# Patient Record
Sex: FEMALE | Race: WHITE | Employment: OTHER | URBAN - METROPOLITAN AREA
[De-identification: names, ages, dates, MRNs, and addresses within clinical notes are randomized per-mention and may not be internally consistent; named-entity substitution may affect disease eponyms.]

---

## 2016-12-30 LAB — CREATININE, EXTERNAL: 1.16

## 2017-02-06 RX ORDER — ESOMEPRAZOLE MAGNESIUM 40 MG/1
CAPSULE, DELAYED RELEASE ORAL
Qty: 90 CAP | Refills: 3 | OUTPATIENT
Start: 2017-02-06

## 2017-02-13 RX ORDER — ESOMEPRAZOLE MAGNESIUM 40 MG/1
40 CAPSULE, DELAYED RELEASE ORAL DAILY
Qty: 90 CAP | Refills: 2 | Status: SHIPPED | COMMUNITY
Start: 2017-02-13 | End: 2017-11-09 | Stop reason: SDUPTHER

## 2017-02-13 NOTE — TELEPHONE ENCOUNTER
[facility] from [facility] called asking for status of this refill request, she needs a call today because this script has been open since last week.     Call her @ [phone]    Reference # [id number]

## 2017-02-13 NOTE — TELEPHONE ENCOUNTER
Orders Placed This Encounter    esomeprazole (NEXIUM) 40 mg capsule     Sig: Take 1 Cap by mouth daily. Dispense:  90 Cap     Refill:  2     The above medication refills were approved via verbal order by Dr. Urbano Mckenna III.

## 2017-02-17 ENCOUNTER — TELEPHONE (OUTPATIENT)
Dept: INTERNAL MEDICINE CLINIC | Age: 62
End: 2017-02-17

## 2017-02-17 NOTE — TELEPHONE ENCOUNTER
[facility] called to let you know pt's insuance will not cover the esomeprazole (NEXIUM) 40 mg capsules until an approved alternate has been tried. They are faxing a second request and said pt's order will  on Monday.

## 2017-02-20 NOTE — TELEPHONE ENCOUNTER
[facility] called again. Said that [employer] will cover Omeprazole and that if it is not entered today that the order will be cancelled.

## 2017-02-20 NOTE — TELEPHONE ENCOUNTER
Unable to contact patient at this time. Calling to see if she wants to pay out of pocket for nexium. [facility] states that pt said that omeprazole did not work.

## 2017-08-04 RX ORDER — ATORVASTATIN CALCIUM 10 MG/1
TABLET, FILM COATED ORAL
Qty: 90 TAB | Refills: 3 | Status: SHIPPED | OUTPATIENT
Start: 2017-08-04 | End: 2018-07-31 | Stop reason: SDUPTHER

## 2017-09-11 ENCOUNTER — OFFICE VISIT (OUTPATIENT)
Dept: INTERNAL MEDICINE CLINIC | Age: 62
End: 2017-09-11

## 2017-09-11 VITALS
WEIGHT: 152 LBS | SYSTOLIC BLOOD PRESSURE: 120 MMHG | OXYGEN SATURATION: 98 % | TEMPERATURE: 97.9 F | RESPIRATION RATE: 16 BRPM | DIASTOLIC BLOOD PRESSURE: 70 MMHG | HEART RATE: 98 BPM | HEIGHT: 61 IN | BODY MASS INDEX: 28.7 KG/M2

## 2017-09-11 DIAGNOSIS — Z12.11 COLON CANCER SCREENING: ICD-10-CM

## 2017-09-11 DIAGNOSIS — Z00.00 ROUTINE GENERAL MEDICAL EXAMINATION AT A HEALTH CARE FACILITY: Primary | ICD-10-CM

## 2017-09-11 DIAGNOSIS — Z12.39 BREAST CANCER SCREENING: ICD-10-CM

## 2017-09-11 DIAGNOSIS — B18.2 CHRONIC HEPATITIS C WITHOUT HEPATIC COMA (HCC): ICD-10-CM

## 2017-09-11 DIAGNOSIS — E78.2 MIXED HYPERLIPIDEMIA: ICD-10-CM

## 2017-09-11 DIAGNOSIS — Z23 NEED FOR ZOSTAVAX ADMINISTRATION: ICD-10-CM

## 2017-09-11 RX ORDER — DEXTROAMPHETAMINE SACCHARATE, AMPHETAMINE ASPARTATE, DEXTROAMPHETAMINE SULFATE AND AMPHETAMINE SULFATE 7.5; 7.5; 7.5; 7.5 MG/1; MG/1; MG/1; MG/1
15 TABLET ORAL 2 TIMES DAILY
Refills: 0 | COMMUNITY
Start: 2017-08-10

## 2017-09-11 RX ORDER — CITALOPRAM 20 MG/1
10 TABLET, FILM COATED ORAL DAILY
COMMUNITY
Start: 2017-08-28 | End: 2018-10-05 | Stop reason: ALTCHOICE

## 2017-09-11 NOTE — PATIENT INSTRUCTIONS
Learning About Benefits From Quitting Smoking  How does quitting smoking make you healthier? If you're thinking about quitting smoking, you may have a few reasons to be smoke-free. Your health may be one of them. · When you quit smoking, you lower your risks for cancer, lung disease, heart attack, stroke, blood vessel disease, and blindness from macular degeneration. · When you're smoke-free, you get sick less often, and you heal faster. You are less likely to get colds, flu, bronchitis, and pneumonia. · As a nonsmoker, you may find that your mood is better and you are less stressed. When and how will you feel healthier? Quitting has real health benefits that start from day 1 of being smoke-free. And the longer you stay smoke-free, the healthier you get and the better you feel. The first hours  · After just 20 minutes, your blood pressure and heart rate go down. That means there's less stress on your heart and blood vessels. · Within 12 hours, the level of carbon monoxide in your blood drops back to normal. That makes room for more oxygen. With more oxygen in your body, you may notice that you have more energy than when you smoked. After 2 weeks  · Your lungs start to work better. · Your risk of heart attack starts to drop. After 1 month  · When your lungs are clear, you cough less and breathe deeper, so it's easier to be active. · Your sense of taste and smell return. That means you can enjoy food more than you have since you started smoking. Over the years  · After 1 year, your risk of heart disease is half what it would be if you kept smoking. · After 5 years, your risk of stroke starts to shrink. Within a few years after that, it's about the same as if you'd never smoked. · After 10 years, your risk of dying from lung cancer is cut by about half. And your risk for many other types of cancer is lower too. How would quitting help others in your life?   When you quit smoking, you improve the health of everyone who now breathes in your smoke. · Their heart, lung, and cancer risks drop, much like yours. · They are sick less. For babies and small children, living smoke-free means they're less likely to have ear infections, pneumonia, and bronchitis. · If you're a woman who is or will be pregnant someday, quitting smoking means a healthier . · Children who are close to you are less likely to become adult smokers.

## 2017-09-11 NOTE — MR AVS SNAPSHOT
Visit Information Date & Time Provider Department Dept. Phone Encounter #  
 9/11/2017  4:00 PM Rohit Elena MD [facility] Internal Medicine [phone] [id number] Upcoming Health Maintenance Date Due COLONOSCOPY 10/6/2015 BREAST CANCER SCRN MAMMOGRAM 5/28/2017 ZOSTER VACCINE AGE 60> 7/15/2017 INFLUENZA AGE 9 TO ADULT 8/1/2017 PAP AKA CERVICAL CYTOLOGY 1/9/2018 DTaP/Tdap/Td series (2 - Td) 9/14/2022 Allergies as of 9/11/2017  Review Complete On: 9/11/2017 By: Rohit Elena MD  
  
 Severity Noted Reaction Type Reactions Tetracycline Medium 01/27/2010   Side Effect Other (comments) GI upset Current Immunizations  Reviewed on 8/19/2015 Name Date Influenza High Dose Vaccine PF  Deferred (Medication not available) Influenza Vaccine 11/4/2014 Influenza Vaccine Split 11/15/2010 Pneumococcal Polysaccharide (PPSV-23) 2/4/2015  2:52 PM,  Deferred (Patient Refused) TDAP Vaccine 9/14/2012 Not reviewed this visit You Were Diagnosed With   
  
 Codes Comments Routine general medical examination at a health care facility    -  Primary ICD-10-CM: Z00.00 ICD-9-CM: V70.0 Breast cancer screening     ICD-10-CM: Z12.39 
ICD-9-CM: V76.10 Need for Zostavax administration     ICD-10-CM: J38 ICD-9-CM: V04.89 Colon cancer screening     ICD-10-CM: Z12.11 ICD-9-CM: V76.51 Chronic hepatitis C without hepatic coma (HCC)     ICD-10-CM: B18.2 ICD-9-CM: 070.54 Mixed hyperlipidemia     ICD-10-CM: E78.2 ICD-9-CM: 272.2 Vitals BP Pulse Temp Resp Height(growth percentile) Weight(growth percentile) 120/70 98 97.9 °F (36.6 °C) (Oral) 16 5' 1\" (1.549 m) 152 lb (68.9 kg) SpO2 BMI OB Status Smoking Status 98% 28.72 kg/m2 Hysterectomy Current Every Day Smoker Vitals History BMI and BSA Data Body Mass Index Body Surface Area 28.72 kg/m 2 1.72 m 2 Preferred Pharmacy Pharmacy Name Phone [address], [facility] [phone] Your Updated Medication List  
  
   
This list is accurate as of: 17  4:56 PM.  Always use your most recent med list.  
  
  
  
  
 aspirin delayed-release 81 mg tablet Take 81 mg by mouth nightly. atorvastatin 10 mg tablet Commonly known as:  LIPITOR  
TAKE 1 TABLET NIGHTLY  
  
 citalopram 20 mg tablet Commonly known as:  Judythe Grade Take 10 mg by mouth daily. dextroamphetamine-amphetamine 30 mg tablet Commonly known as:  ADDERALL  
TAKE 1 TABLET BY MOUTH TWICE A DAY  
  
 esomeprazole 40 mg capsule Commonly known as:  Brittany Kugel Take 1 Cap by mouth daily. KlonoPIN 0.5 mg tablet Generic drug:  clonazePAM  
Take 0.5 mg by mouth nightly. varicella zoster vacine live 19,400 unit/0.65 mL Susr injection Commonly known as:  ZOSTAVAX  
1 Vial by SubCUTAneous route once for 1 dose. Indications: PREVENTION OF HERPES ZOSTER  
  
 VITAMIN D3 2,000 unit Tab Generic drug:  cholecalciferol (vitamin D3) Take 2,000 Units by mouth daily. Prescriptions Printed Refills  
 varicella zoster vacine live (ZOSTAVAX) 19,400 unit/0.65 mL susr injection 0 Si Vial by SubCUTAneous route once for 1 dose. Indications: PREVENTION OF HERPES ZOSTER Class: Print Route: SubCUTAneous We Performed the Following CBC WITH AUTOMATED DIFF [04497 CPT(R)] LIPID PANEL [69205 CPT(R)] METABOLIC PANEL, COMPREHENSIVE [30335 CPT(R)] TSH RFX ON ABNORMAL TO FREE T4 [[id number] Custom] UA/M W/RFLX CULTURE, ROUTINE [[id number] Custom] To-Do List   
 2017 Imaging:  [facility] BI SCREENING INCL CAD Patient Instructions Learning About Benefits From Quitting Smoking How does quitting smoking make you healthier? If you're thinking about quitting smoking, you may have a few reasons to be smoke-free. Your health may be one of them. · When you quit smoking, you lower your risks for cancer, lung disease, heart attack, stroke, blood vessel disease, and blindness from macular degeneration. · When you're smoke-free, you get sick less often, and you heal faster. You are less likely to get colds, flu, bronchitis, and pneumonia. · As a nonsmoker, you may find that your mood is better and you are less stressed. When and how will you feel healthier? Quitting has real health benefits that start from day 1 of being smoke-free. And the longer you stay smoke-free, the healthier you get and the better you feel. The first hours · After just 20 minutes, your blood pressure and heart rate go down. That means there's less stress on your heart and blood vessels. · Within 12 hours, the level of carbon monoxide in your blood drops back to normal. That makes room for more oxygen. With more oxygen in your body, you may notice that you have more energy than when you smoked. After 2 weeks · Your lungs start to work better. · Your risk of heart attack starts to drop. After 1 month · When your lungs are clear, you cough less and breathe deeper, so it's easier to be active. · Your sense of taste and smell return. That means you can enjoy food more than you have since you started smoking. Over the years · After 1 year, your risk of heart disease is half what it would be if you kept smoking. · After 5 years, your risk of stroke starts to shrink. Within a few years after that, it's about the same as if you'd never smoked. · After 10 years, your risk of dying from lung cancer is cut by about half. And your risk for many other types of cancer is lower too. How would quitting help others in your life? When you quit smoking, you improve the health of everyone who now breathes in your smoke. · Their heart, lung, and cancer risks drop, much like yours. · They are sick less.  For babies and small children, living smoke-free means they're less likely to have ear infections, pneumonia, and bronchitis. · If you're a woman who is or will be pregnant someday, quitting smoking means a healthier . · Children who are close to you are less likely to become adult smokers. Introducing [facility]! Dear Luisana Aviles: Thank you for requesting a [employer] account. Our records indicate that you already have an active [employer] account. You can access your account anytime at [email or web address] Did you know that you can access your hospital and ER discharge instructions at any time in Click[employer]? You can also review all of your test results from your hospital stay or ER visit. Additional Information If you have questions, please visit the Frequently Asked Questions section of the [employer] website at [email or web address] Remember, Click[employer] is NOT to be used for urgent needs. For medical emergencies, dial 911. Now available from your iPhone and Android! Please provide this summary of care documentation to your next provider. Your primary care clinician is listed as Luis [phone] If you have any questions after today's visit, please call [phone].

## 2017-09-12 NOTE — PROGRESS NOTES
Subjective:   63 y.o. female for Well Woman Check. Her gyne and breast care is done elsewhere by her Ob-Gyne physician. She has been doing better recently. She did have an episode of major depression earlier this year and was taken out of work. She was offered early FDC and accepted it. She has been doing much better since then. Her weight is down. She is attempted to quit smoking unsuccessfully. Denies headaches or dizziness. No nosebleeds. No chest pains or shortness of breath. No cough or wheeze. No change in bowel or bladder habits. 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 Indications: PREVENTION OF HERPES ZOSTER 0.65 mL 0    dextroamphetamine-amphetamine (ADDERALL) 30 mg tablet TAKE 1 TABLET BY MOUTH TWICE A DAY  0    citalopram (CELEXA) 20 mg tablet Take 10 mg by mouth daily.  atorvastatin (LIPITOR) 10 mg tablet TAKE 1 TABLET NIGHTLY 90 Tab 3    esomeprazole (NEXIUM) 40 mg capsule Take 1 Cap by mouth daily. 90 Cap 2    aspirin delayed-release 81 mg tablet Take 81 mg by mouth nightly.  cholecalciferol, vitamin D3, (VITAMIN D3) 2,000 unit Tab Take 2,000 Units by mouth daily.  clonazePAM (KLONOPIN) 0.5 mg tablet Take 0.5 mg by mouth nightly.        Allergies   Allergen Reactions    Tetracycline Other (comments)     GI upset     Past Medical History:   Diagnosis Date    Anxiety     CAD (coronary artery disease)     Colon polyps     Depression     GERD (gastroesophageal reflux disease)     Hemorrhoids     Hepatitis C     genotype 1    History of kidney stones     Hx of amaurosis fugax     right eye, 2014    Liver disease 2011    hep c, [place] 2011    Meningioma [facility]) 2014    SURGERY TO BE SCHEDULED    Osteoporosis 2008    Psychiatric disorder     ANXIETY AND DEPRESSION    PVD (peripheral vascular disease) ([facility] [address] 2/2014    W/STENT , DR Neela Kirk    Seizures ([facility] [address] 2013    ?  SEIZURE, CONFUSION, TROUBLE WITH SPEECH     Past Surgical History:   Procedure Laterality Date    HX CHOLECYSTECTOMY  2001    HX CRANIOTOMY  6/25/14    meningioma r medial sphenoid wing    HX GYN  1984    Tubal pregnancy    HX HYSTERECTOMY      HX OTHER SURGICAL  2/27/2014    Dr. Pennie Subramanian      [facility] UNLIST  2/2014    ABD AORTIC STENT    VASCULAR SURGERY PROCEDURE UNLIST Right 6/10/14    CAROTID      Family History   Problem Relation Age of Onset    Heart Disease Mother     Anxiety Mother     Cancer Mother      Lung    Heart Surgery Mother      CABG X3 VESSEL    Cancer Sister      vaginal    Other Sister      fibromyalgia    Diabetes Sister     Anxiety Sister     Anesth Problems Neg Hx      Social History   Substance Use Topics    Smoking status: Current Every Day Smoker     Packs/day: 0.25     Years: 43.00     Types: Cigarettes    Smokeless tobacco: Never Used      Comment: joined [place] [employer]  Alcohol use No        Lab Results  Component Value Date/Time   WBC 9.7 03/31/2016 03:30 PM   HGB 12.3 03/31/2016 03:30 PM   Hemoglobin (POC) 13.9 05/13/2011 11:36 AM   HCT 38.8 03/31/2016 03:30 PM   Hematocrit (POC) 41 05/13/2011 11:36 AM   PLATELET 923 89/90/4383 03:30 PM   MCV 88 03/31/2016 03:30 PM   Lab Results  Component Value Date/Time   Cholesterol, total 136 08/21/2015 09:18 AM   HDL Cholesterol 40 08/21/2015 09:18 AM   LDL, calculated 76 08/21/2015 09:18 AM   Triglyceride 99 08/21/2015 09:18 AM CHOL/HDL Ratio 3.8 02/04/2015 03:25 AM   Lab Results  Component Value Date/Time   ALT (SGPT) 20 07/21/2016 11:27 PM   AST (SGOT) 18 07/21/2016 11:27 PM   Alk. phosphatase 156 07/21/2016 11:27 PM   Bilirubin, direct <0.1 07/21/2016 11:27 PM   Bilirubin, total 0.2 07/21/2016 11:27 PM   Albumin 3.7 07/21/2016 11:27 PM   Protein, total 7.2 07/21/2016 11:27 PM   INR 1.1 02/04/2015 03:25 AM   Prothrombin time 10.6 02/04/2015 03:25 AM   PLATELET 585 96/45/1194 03:30 PM       Lab Results  Component Value Date/Time   GFR est non-AA 78 03/31/2016 03:30 PM   GFRNA, POC >60 05/13/2011 11:36 AM   GFR est AA 90 03/31/2016 03:30 PM   GFRAA, POC >60 05/13/2011 11:36 AM   Creatinine 0.83 03/31/2016 03:30 PM   Creatinine (POC) 0.6 05/13/2011 11:36 AM   BUN 15 03/31/2016 03:30 PM   BUN (POC) 8 05/13/2011 11:36 AM   Sodium 138 03/31/2016 03:30 PM   Sodium (POC) 140 05/13/2011 11:36 AM   Potassium 4.3 03/31/2016 03:30 PM   Potassium (POC) 4.3 05/13/2011 11:36 AM   Chloride 101 03/31/2016 03:30 PM   Chloride (POC) 105 05/13/2011 11:36 AM   CO2 23 03/31/2016 03:30 PM   Lab Results  Component Value Date/Time   TSH 3.370 05/20/2013 08:36 AM   T3 Uptake 21 09/16/2011 12:00 AM   T4, Total 16.6 09/16/2011 12:00 AM      Lab Results   Component Value Date/Time    Glucose 101 03/31/2016 03:30 PM    Glucose (POC) 94 05/13/2011 11:36 AM    .    Review of Systems  A comprehensive review of systems was negative except for that written in the HPI. Objective:   Blood pressure 120/70, pulse 98, temperature 97.9 °F (36.6 °C), temperature source Oral, resp. rate 16, height 5' 1\" (1.549 m), weight 152 lb (68.9 kg), SpO2 98 %.    Physical Examination:   General appearance - alert, well appearing, and in no distress  Eyes - pupils equal and reactive, extraocular eye movements intact  Ears - bilateral TM's and external ear canals normal  Nose - normal and patent, no erythema, discharge or polyps  Mouth - mucous membranes moist, pharynx normal without lesions  Neck - supple, no significant adenopathy  Lymphatics - no palpable lymphadenopathy, no hepatosplenomegaly  Chest - clear to auscultation, no wheezes, rales or rhonchi, symmetric air entry  Heart - normal rate, regular rhythm, normal S1, S2, no murmurs, rubs, clicks or gallops  Abdomen - soft, nontender, nondistended, no masses or organomegaly  Back exam - full range of motion, no tenderness, palpable spasm or pain on motion  Neurological - alert, oriented, normal speech, no focal findings or movement disorder noted  Musculoskeletal - no joint tenderness, deformity or swelling  Extremities - peripheral pulses normal, no pedal edema, no clubbing or cyanosis     Assessment/Plan:     lose weight, increase physical activity, stop smoking (advice and handout given), routine labs ordered  Diagnoses and all orders for this visit:    1. Routine general medical examination at a health care facility  -     CBC WITH AUTOMATED DIFF  -     METABOLIC PANEL, COMPREHENSIVE  -     LIPID PANEL  -     TSH RFX ON ABNORMAL TO FREE T4  -     UA/M W/RFLX CULTURE, ROUTINE  -     varicella zoster vacine live (ZOSTAVAX) 19,400 unit/0.65 mL susr injection; 1 Vial by SubCUTAneous route once for 1 dose. Indications: PREVENTION OF HERPES ZOSTER    2. Breast cancer screening  -     [facility] MAMMO BI SCREENING INCL CAD; Future    3. Need for Zostavax administration    4. Colon cancer screening    5. Chronic hepatitis C without hepatic coma (HCC) now cured. Will check her liver function test for stability. 6. Mixed hyperlipidemia check lipids for LDL goal of 100. We will continue her current medicines for now assuming this is reached. Appears to be tolerating well. Follow-up Disposition: 12 months and as needed   Advised her to call back or return to office if symptoms worsen/change/persist.  Discussed expected course/resolution/complications of diagnosis in detail with patient.     Medication risks/benefits/costs/interactions/alternatives discussed with patient. She was given an after visit summary which includes diagnoses, current medications, & vitals. She expressed understanding with the diagnosis and plan.

## 2017-09-17 LAB
ALBUMIN SERPL-MCNC: 4.2 G/DL (ref 3.6–4.8)
ALBUMIN/GLOB SERPL: 1.6 {RATIO} (ref 1.2–2.2)
ALP SERPL-CCNC: 159 IU/L (ref 39–117)
ALT SERPL-CCNC: 10 IU/L (ref 0–32)
APPEARANCE UR: CLEAR
AST SERPL-CCNC: 19 IU/L (ref 0–40)
BACTERIA #/AREA URNS HPF: NORMAL /[HPF]
BASOPHILS # BLD AUTO: 0 X10E3/UL (ref 0–0.2)
BASOPHILS NFR BLD AUTO: 1 %
BILIRUB SERPL-MCNC: 0.2 MG/DL (ref 0–1.2)
BILIRUB UR QL STRIP: NEGATIVE
BUN SERPL-MCNC: 13 MG/DL (ref 8–27)
BUN/CREAT SERPL: 17 (ref 12–28)
CALCIUM SERPL-MCNC: 9.6 MG/DL (ref 8.7–10.3)
CASTS URNS QL MICRO: NORMAL /LPF
CHLORIDE SERPL-SCNC: 98 MMOL/L (ref 96–106)
CHOLEST SERPL-MCNC: 161 MG/DL (ref 100–199)
CO2 SERPL-SCNC: 23 MMOL/L (ref 18–29)
COLOR UR: YELLOW
CREAT SERPL-MCNC: 0.76 MG/DL (ref 0.57–1)
EOSINOPHIL # BLD AUTO: 0.1 X10E3/UL (ref 0–0.4)
EOSINOPHIL NFR BLD AUTO: 2 %
EPI CELLS #/AREA URNS HPF: NORMAL /HPF
ERYTHROCYTE [DISTWIDTH] IN BLOOD BY AUTOMATED COUNT: 13.5 % (ref 12.3–15.4)
GLOBULIN SER CALC-MCNC: 2.7 G/DL (ref 1.5–4.5)
GLUCOSE SERPL-MCNC: 91 MG/DL (ref 65–99)
GLUCOSE UR QL: NEGATIVE
HCT VFR BLD AUTO: 42.4 % (ref 34–46.6)
HDLC SERPL-MCNC: 46 MG/DL
HGB BLD-MCNC: 13.9 G/DL (ref 11.1–15.9)
HGB UR QL STRIP: NEGATIVE
IMM GRANULOCYTES # BLD: 0 X10E3/UL (ref 0–0.1)
IMM GRANULOCYTES NFR BLD: 0 %
KETONES UR QL STRIP: NEGATIVE
LDLC SERPL CALC-MCNC: 95 MG/DL (ref 0–99)
LEUKOCYTE ESTERASE UR QL STRIP: NEGATIVE
LYMPHOCYTES # BLD AUTO: 3 X10E3/UL (ref 0.7–3.1)
LYMPHOCYTES NFR BLD AUTO: 37 %
MCH RBC QN AUTO: 29 PG (ref 26.6–33)
MCHC RBC AUTO-ENTMCNC: 32.8 G/DL (ref 31.5–35.7)
MCV RBC AUTO: 88 FL (ref 79–97)
MICRO URNS: NORMAL
MICRO URNS: NORMAL
MONOCYTES # BLD AUTO: 0.4 X10E3/UL (ref 0.1–0.9)
MONOCYTES NFR BLD AUTO: 5 %
MUCOUS THREADS URNS QL MICRO: PRESENT
NEUTROPHILS # BLD AUTO: 4.6 X10E3/UL (ref 1.4–7)
NEUTROPHILS NFR BLD AUTO: 55 %
NITRITE UR QL STRIP: NEGATIVE
PH UR STRIP: 6.5 [PH] (ref 5–7.5)
PLATELET # BLD AUTO: 237 X10E3/UL (ref 150–379)
POTASSIUM SERPL-SCNC: 4.4 MMOL/L (ref 3.5–5.2)
PROT SERPL-MCNC: 6.9 G/DL (ref 6–8.5)
PROT UR QL STRIP: NEGATIVE
RBC # BLD AUTO: 4.8 X10E6/UL (ref 3.77–5.28)
RBC #/AREA URNS HPF: NORMAL /HPF
SODIUM SERPL-SCNC: 139 MMOL/L (ref 134–144)
SP GR UR: 1.02 (ref 1–1.03)
TRIGL SERPL-MCNC: 100 MG/DL (ref 0–149)
TSH SERPL DL<=0.005 MIU/L-ACNC: 3.03 UIU/ML (ref 0.45–4.5)
URINALYSIS REFLEX, 377202: NORMAL
UROBILINOGEN UR STRIP-MCNC: 0.2 MG/DL (ref 0.2–1)
VLDLC SERPL CALC-MCNC: 20 MG/DL (ref 5–40)
WBC # BLD AUTO: 8.1 X10E3/UL (ref 3.4–10.8)
WBC #/AREA URNS HPF: NORMAL /HPF

## 2017-09-27 ENCOUNTER — HOSPITAL ENCOUNTER (OUTPATIENT)
Dept: MAMMOGRAPHY | Age: 62
Discharge: HOME | End: 2017-09-27
Attending: INTERNAL MEDICINE
Payer: COMMERCIAL

## 2017-09-27 DIAGNOSIS — Z12.39 BREAST CANCER SCREENING: ICD-10-CM

## 2017-09-27 PROCEDURE — 77067 SCR MAMMO BI INCL CAD: CPT

## 2017-11-09 RX ORDER — ESOMEPRAZOLE MAGNESIUM 40 MG/1
CAPSULE, DELAYED RELEASE ORAL
Qty: 90 CAP | Refills: 3 | Status: SHIPPED | OUTPATIENT
Start: 2017-11-09 | End: 2018-03-28

## 2018-03-28 ENCOUNTER — OFFICE VISIT (OUTPATIENT)
Dept: INTERNAL MEDICINE CLINIC | Age: 63
End: 2018-03-28

## 2018-03-28 VITALS
DIASTOLIC BLOOD PRESSURE: 70 MMHG | WEIGHT: 149.2 LBS | OXYGEN SATURATION: 96 % | HEART RATE: 97 BPM | SYSTOLIC BLOOD PRESSURE: 130 MMHG | RESPIRATION RATE: 18 BRPM | BODY MASS INDEX: 28.17 KG/M2 | HEIGHT: 61 IN | TEMPERATURE: 98.7 F

## 2018-03-28 DIAGNOSIS — G56.01 CARPAL TUNNEL SYNDROME OF RIGHT WRIST: ICD-10-CM

## 2018-03-28 DIAGNOSIS — D32.9 MENINGIOMA (HCC): Primary | ICD-10-CM

## 2018-03-28 DIAGNOSIS — I65.21 OBSTRUCTION OF RIGHT CAROTID ARTERY: ICD-10-CM

## 2018-03-28 DIAGNOSIS — I73.9 PVD (PERIPHERAL VASCULAR DISEASE) (HCC): ICD-10-CM

## 2018-03-28 RX ORDER — PANTOPRAZOLE SODIUM 40 MG/1
40 TABLET, DELAYED RELEASE ORAL DAILY
Qty: 90 TAB | Refills: 3 | Status: SHIPPED | OUTPATIENT
Start: 2018-03-28 | End: 2018-10-05 | Stop reason: ALTCHOICE

## 2018-03-28 NOTE — MR AVS SNAPSHOT
[address] [facility] [address] [place] [address] 
[phone] Patient: Zoran Abbott MRN:  [id number] Visit Information Date & Time Provider Department Dept. Phone Encounter #  
 3/28/2018 10:00 AM Va Shoemaker MD [facility] Internal Medicine [phone] [id number] Follow-up Instructions Return in about 6 months (around 9/28/2018). Upcoming Health Maintenance Date Due  
 BREAST CANCER SCRN MAMMOGRAM 9/27/2019 PAP AKA CERVICAL CYTOLOGY 8/31/2020 COLONOSCOPY 10/27/2020 DTaP/Tdap/Td series (2 - Td) 9/14/2022 Allergies as of 3/28/2018  Review Complete On: 9/11/2017 By: Va Shoemaker MD  
  
 Severity Noted Reaction Type Reactions Tetracycline Medium 01/27/2010   Side Effect Other (comments) GI upset Current Immunizations  Reviewed on 3/28/2018 Name Date Influenza High Dose Vaccine PF  Deferred (Medication not available) Influenza Vaccine 11/6/2017, 11/4/2014 Influenza Vaccine Split 11/15/2010 Pneumococcal Polysaccharide (PPSV-23) 2/4/2015  2:52 PM,  Deferred (Patient Refused) TDAP Vaccine 9/14/2012 Zoster Vaccine, Live 11/6/2017 Reviewed by Va Shoemaker MD on 3/28/2018 at 10:38 AM  
You Were Diagnosed With   
  
 Codes Comments Meningioma ([facility]    -  Primary ICD-10-CM: D32.9 ICD-9-CM: 225.2 Obstruction of right carotid artery     ICD-10-CM: I65.21 ICD-9-CM: 433.10 PVD (peripheral vascular disease) ([facility]     ICD-10-CM: I73.9 ICD-9-CM: 443.9 Carpal tunnel syndrome of right wrist     ICD-10-CM: G56.01 
ICD-9-CM: 354.0 Vitals BP Pulse Temp Resp Height(growth percentile) Weight(growth percentile) 130/70 97 98.7 °F (37.1 °C) (Oral) 18 5' 1\" (1.549 m) 149 lb 3.2 oz (67.7 kg) SpO2 BMI OB Status Smoking Status 96% 28.19 kg/m2 Hysterectomy Current Every Day Smoker Vitals History BMI and BSA Data Body Mass Index Body Surface Area  
 28.19 kg/m 2 1.71 m 2 Preferred Pharmacy Pharmacy Name Phone [address] [facility] [phone] Your Updated Medication List  
  
   
This list is accurate as of 3/28/18 10:39 AM.  Always use your most recent med list.  
  
  
  
  
 aspirin delayed-release 81 mg tablet Take 81 mg by mouth nightly. atorvastatin 10 mg tablet Commonly known as:  LIPITOR  
TAKE 1 TABLET NIGHTLY  
  
 citalopram 20 mg tablet Commonly known as:  Chepe Bird Take 10 mg by mouth daily. dextroamphetamine-amphetamine 30 mg tablet Commonly known as:  ADDERALL  
TAKE 1 TABLET BY MOUTH TWICE A DAY KlonoPIN 0.5 mg tablet Generic drug:  clonazePAM  
Take 0.5 mg by mouth nightly. pantoprazole 40 mg tablet Commonly known as:  PROTONIX Take 1 Tab by mouth daily. VITAMIN D3 2,000 unit Tab Generic drug:  cholecalciferol (vitamin D3) Take 2,000 Units by mouth daily. Prescriptions Sent to Pharmacy Refills  
 pantoprazole (PROTONIX) 40 mg tablet 3 Sig: Take 1 Tab by mouth daily. Class: Normal  
 Pharmacy: [address], [address] Ph #: [phone] Route: Oral  
  
We Performed the Following CBC WITH AUTOMATED DIFF [18163 CPT(R)] LIPID PANEL [65036 CPT(R)] METABOLIC PANEL, COMPREHENSIVE [71653 CPT(R)] Follow-up Instructions Return in about 6 months (around 9/28/2018). Patient Instructions Carpal Tunnel Syndrome: Care Instructions Your Care Instructions Carpal tunnel syndrome is a nerve problem. It can cause tingling, numbness, weakness, or pain in the fingers, thumb, and hand. The median nerve and several tough tissues called tendons run through a space in the wrist called the carpal tunnel.  The repeated hand motions used in work and some hobbies and sports can put pressure on the nerve. Pregnancy and several conditions, including diabetes, arthritis, and an underactive thyroid, also can cause carpal tunnel syndrome. You may be able to limit an activity or do it differently to reduce your symptoms. You also can take other steps to feel better. If your symptoms are mild, 1 to 2 weeks of home treatment are likely to ease your pain. Surgery is needed only if other treatments do not work. Follow-up care is a key part of your treatment and safety. Be sure to make and go to all appointments, and call your doctor if you are having problems. It's also a good idea to know your test results and keep a list of the medicines you take. How can you care for yourself at home? · If possible, stop or reduce the activity that causes your symptoms. If you cannot stop the activity, take frequent breaks to rest and stretch or change hand positions to do a task. Try switching hands, such as when using a computer mouse. · Try to avoid bending or twisting your wrists. · Ask your doctor if you can take an over-the-counter pain medicine, such as acetaminophen (Tylenol), ibuprofen (Advil, Motrin), or naproxen (Aleve). Be safe with medicines. Read and follow all instructions on the label. · If your doctor prescribes corticosteroid medicine to help reduce pain and swelling, take it exactly as prescribed. Call your doctor if you think you are having a problem with your medicine. · Put ice or a cold pack on your wrist for 10 to 20 minutes at a time to ease pain. Put a thin cloth between the ice and your skin. · If your doctor or your physical or occupational therapist tells you to wear a wrist splint, wear it as directed to keep your wrist in a neutral position. This also eases pressure on your median nerve. · Ask your doctor whether you should have physical or occupational therapy to learn how to do tasks differently. · Try a yoga class to stretch your muscles and build strength in your hands and wrists. Yoga has been shown to ease carpal tunnel symptoms. To prevent carpal tunnel · When working at a computer, keep your hands and wrists in line with your forearms. Hold your elbows close to your sides. Take a break every 10 to 15 minutes. · Try these exercises: ¨ Warm up: Rotate your wrist up, down, and from side to side. Repeat this 4 times. Stretch your fingers far apart, relax them, then stretch them again. Repeat 4 times. Stretch your thumb by pulling it back gently, holding it, and then releasing it. Repeat 4 times. ¨ Prayer stretch: Start with your palms together in front of your chest just below your chin. Slowly lower your hands toward your waistline while keeping your hands close to your stomach and your palms together until you feel a mild to moderate stretch under your forearms. Hold for 10 to 20 seconds. Repeat 4 times. ¨ Wrist flexor stretch: Hold your arm in front of you with your palm up. Bend your wrist, pointing your hand toward the floor. With your other hand, gently bend your wrist further until you feel a mild to moderate stretch in your forearm. Hold for 10 to 20 seconds. Repeat 4 times. ¨ Wrist extensor stretch: Repeat the steps for the wrist flexor stretch, but begin with your extended hand palm down. · Squeeze a rubber exercise ball several times a day to keep your hands and fingers strong. · Avoid holding objects (such as a book) in one position for a long time. When possible, use your whole hand to grasp an object. Using just the thumb and index finger can put stress on the wrist. 
· Do not smoke. It can make this condition worse by reducing blood flow to the median nerve. If you need help quitting, talk to your doctor about stop-smoking programs and medicines. These can increase your chances of quitting for good. When should you call for help? Watch closely for changes in your health, and be sure to contact your doctor if: 
? · Your pain or other problems do not get better with home care. ? · You want more information about physical or occupational therapy. ? · You have side effects of your corticosteroid medicine, such as: 
¨ Weight gain. ¨ Mood changes. ¨ Trouble sleeping. ¨ Bruising easily. ? · You have any other problems with your medicine. Where can you learn more? Go to http://akira-francia.info/. Enter R432 in the search box to learn more about \"Carpal Tunnel Syndrome: Care Instructions. \" Current as of: March 21, 2017 Content Version: 11.4 © 3251-1868 [employer]. Care instructions adapted under license by [employer] (which disclaims liability or warranty for this information). If you have questions about a medical condition or this instruction, always ask your healthcare professional. [place] [address] any warranty or liability for your use of this information. Carpal Tunnel Syndrome: Exercises Your Care Instructions Here are some examples of typical rehabilitation exercises for your condition. Start each exercise slowly. Ease off the exercise if you start to have pain. Your doctor or your physical or occupational therapist will tell you when you can start these exercises and which ones will work best for you. Warm-up stretches When you no longer have pain or numbness, you can do exercises to help prevent carpal tunnel syndrome from coming back. Do not do any stretch or movement that is uncomfortable or painful. 1. Rotate your wrist up, down, and from side to side. Repeat 4 times. 2. Stretch your fingers far apart. Relax them, and then stretch them again. Repeat 4 times. 3. Stretch your thumb by pulling it back gently, holding it, and then releasing it. Repeat 4 times. How to do the exercises Prayer stretch 1. Start with your palms together in front of your chest just below your chin. 2. Slowly lower your hands toward your waistline, keeping your hands close to your stomach and your palms together until you feel a mild to moderate stretch under your forearms. 3. Hold for at least 15 to 30 seconds. Repeat 2 to 4 times. Wrist flexor stretch 1. Extend your arm in front of you with your palm up. 2. Bend your wrist, pointing your hand toward the floor. 3. With your other hand, gently bend your wrist farther until you feel a mild to moderate stretch in your forearm. 4. Hold for at least 15 to 30 seconds. Repeat 2 to 4 times. Wrist extensor stretch 1. Repeat steps 1 through 4 of the stretch above, but begin with your extended hand palm down. Follow-up care is a key part of your treatment and safety. Be sure to make and go to all appointments, and call your doctor if you are having problems. It's also a good idea to know your test results and keep a list of the medicines you take. Where can you learn more? Go to http://akira-francia.info/. Enter Z570 in the search box to learn more about \"Carpal Tunnel Syndrome: Exercises. \" Current as of: March 21, 2017 Content Version: 11.4 © 5431-9460 Healthwise, [employer]. Care instructions adapted under license by [employer] (which disclaims liability or warranty for this information). If you have questions about a medical condition or this instruction, always ask your healthcare professional. [place] [address] any warranty or liability for your use of this information. Introducing [facility]! Dear Palak Cai: Thank you for requesting a [employer] account. Our records indicate that you already have an active [employer] account. You can access your account anytime at [email or web address] Did you know that you can access your hospital and ER discharge instructions at any time in Polwire? You can also review all of your test results from your hospital stay or ER visit. Additional Information If you have questions, please visit the Frequently Asked Questions section of the Polwire website at [email or web address] Remember, Polwire is NOT to be used for urgent needs. For medical emergencies, dial 911. Now available from your iPhone and Android! Please provide this summary of care documentation to your next provider. Your primary care clinician is listed as Luis [phone] If you have any questions after today's visit, please call [phone].

## 2018-03-28 NOTE — PATIENT INSTRUCTIONS
Carpal Tunnel Syndrome: Care Instructions  Your Care Instructions    Carpal tunnel syndrome is a nerve problem. It can cause tingling, numbness, weakness, or pain in the fingers, thumb, and hand. The median nerve and several tough tissues called tendons run through a space in the wrist called the carpal tunnel. The repeated hand motions used in work and some hobbies and sports can put pressure on the nerve. Pregnancy and several conditions, including diabetes, arthritis, and an underactive thyroid, also can cause carpal tunnel syndrome. You may be able to limit an activity or do it differently to reduce your symptoms. You also can take other steps to feel better. If your symptoms are mild, 1 to 2 weeks of home treatment are likely to ease your pain. Surgery is needed only if other treatments do not work. Follow-up care is a key part of your treatment and safety. Be sure to make and go to all appointments, and call your doctor if you are having problems. It's also a good idea to know your test results and keep a list of the medicines you take. How can you care for yourself at home? · If possible, stop or reduce the activity that causes your symptoms. If you cannot stop the activity, take frequent breaks to rest and stretch or change hand positions to do a task. Try switching hands, such as when using a computer mouse. · Try to avoid bending or twisting your wrists. · Ask your doctor if you can take an over-the-counter pain medicine, such as acetaminophen (Tylenol), ibuprofen (Advil, Motrin), or naproxen (Aleve). Be safe with medicines. Read and follow all instructions on the label. · If your doctor prescribes corticosteroid medicine to help reduce pain and swelling, take it exactly as prescribed. Call your doctor if you think you are having a problem with your medicine. · Put ice or a cold pack on your wrist for 10 to 20 minutes at a time to ease pain.  Put a thin cloth between the ice and your skin.  · If your doctor or your physical or occupational therapist tells you to wear a wrist splint, wear it as directed to keep your wrist in a neutral position. This also eases pressure on your median nerve. · Ask your doctor whether you should have physical or occupational therapy to learn how to do tasks differently. · Try a yoga class to stretch your muscles and build strength in your hands and wrists. Yoga has been shown to ease carpal tunnel symptoms. To prevent carpal tunnel  · When working at a computer, keep your hands and wrists in line with your forearms. Hold your elbows close to your sides. Take a break every 10 to 15 minutes. · Try these exercises:  ¨ Warm up: Rotate your wrist up, down, and from side to side. Repeat this 4 times. Stretch your fingers far apart, relax them, then stretch them again. Repeat 4 times. Stretch your thumb by pulling it back gently, holding it, and then releasing it. Repeat 4 times. ¨ Prayer stretch: Start with your palms together in front of your chest just below your chin. Slowly lower your hands toward your waistline while keeping your hands close to your stomach and your palms together until you feel a mild to moderate stretch under your forearms. Hold for 10 to 20 seconds. Repeat 4 times. ¨ Wrist flexor stretch: Hold your arm in front of you with your palm up. Bend your wrist, pointing your hand toward the floor. With your other hand, gently bend your wrist further until you feel a mild to moderate stretch in your forearm. Hold for 10 to 20 seconds. Repeat 4 times. ¨ Wrist extensor stretch: Repeat the steps for the wrist flexor stretch, but begin with your extended hand palm down. · Squeeze a rubber exercise ball several times a day to keep your hands and fingers strong. · Avoid holding objects (such as a book) in one position for a long time. When possible, use your whole hand to grasp an object.  Using just the thumb and index finger can put stress on the wrist.  · Do not smoke. It can make this condition worse by reducing blood flow to the median nerve. If you need help quitting, talk to your doctor about stop-smoking programs and medicines. These can increase your chances of quitting for good. When should you call for help? Watch closely for changes in your health, and be sure to contact your doctor if:  ? · Your pain or other problems do not get better with home care. ? · You want more information about physical or occupational therapy. ? · You have side effects of your corticosteroid medicine, such as:  ¨ Weight gain. ¨ Mood changes. ¨ Trouble sleeping. ¨ Bruising easily. ? · You have any other problems with your medicine. Where can you learn more? Go to http://akira-francia.info/. Enter R432 in the search box to learn more about \"Carpal Tunnel Syndrome: Care Instructions. \"  Current as of: March 21, 2017  Content Version: 11.4  © 2686-9493 [employer]. Care instructions adapted under license by [employer]er[employer]ound (which disclaims liability or warranty for this information). If you have questions about a medical condition or this instruction, always ask your healthcare professional. [place] [address] any warranty or liability for your use of this information. Carpal Tunnel Syndrome: Exercises  Your Care Instructions  Here are some examples of typical rehabilitation exercises for your condition. Start each exercise slowly. Ease off the exercise if you start to have pain. Your doctor or your physical or occupational therapist will tell you when you can start these exercises and which ones will work best for you. Warm-up stretches  When you no longer have pain or numbness, you can do exercises to help prevent carpal tunnel syndrome from coming back. Do not do any stretch or movement that is uncomfortable or painful. 1. Rotate your wrist up, down, and from side to side.  Repeat 4 times.  2. Stretch your fingers far apart. Relax them, and then stretch them again. Repeat 4 times. 3. Stretch your thumb by pulling it back gently, holding it, and then releasing it. Repeat 4 times. How to do the exercises  Prayer stretch    1. Start with your palms together in front of your chest just below your chin. 2. Slowly lower your hands toward your waistline, keeping your hands close to your stomach and your palms together until you feel a mild to moderate stretch under your forearms. 3. Hold for at least 15 to 30 seconds. Repeat 2 to 4 times. Wrist flexor stretch    1. Extend your arm in front of you with your palm up. 2. Bend your wrist, pointing your hand toward the floor. 3. With your other hand, gently bend your wrist farther until you feel a mild to moderate stretch in your forearm. 4. Hold for at least 15 to 30 seconds. Repeat 2 to 4 times. Wrist extensor stretch    1. Repeat steps 1 through 4 of the stretch above, but begin with your extended hand palm down. Follow-up care is a key part of your treatment and safety. Be sure to make and go to all appointments, and call your doctor if you are having problems. It's also a good idea to know your test results and keep a list of the medicines you take. Where can you learn more? Go to http://akira-francia.info/. Enter J564 in the search box to learn more about \"Carpal Tunnel Syndrome: Exercises. \"  Current as of: March 21, 2017  Content Version: 11.4  © 1454-2628 Healthwise, [employer]. Care instructions adapted under license by [employer] (which disclaims liability or warranty for this information). If you have questions about a medical condition or this instruction, always ask your healthcare professional. [address] any warranty or liability for your use of this information.

## 2018-03-29 NOTE — PROGRESS NOTES
HPI:  Angelina Andrade is a 63y.o. year old female who is here for a routine visit:    Here for her follow-up visit. She has been generally doing fairly well. She has noted over the last few weeks some pain in the left calf. She notes it mostly when she is up moving around. She has a follow-up appointment this Friday with her vascular surgeon. She has had tingling in her right hand particularly in her first 3 fingers. She denies any chest pains or shortness of breath. No cough or wheeze. No change in bowel habits. Her Nexium is no longer covered by her insurance. She previously failed Prilosec and we will try Protonix today to see if that works. Past Medical History:   Diagnosis Date    Anxiety     CAD (coronary artery disease)     Colon polyps     Depression     GERD (gastroesophageal reflux disease)     Hemorrhoids     Hepatitis C     genotype 1    History of kidney stones     Hx of amaurosis fugax     right eye, 2014    Liver disease 2011    hep c, [place] 2011    Meningi[facility]) 2014    SURGERY TO BE SCHEDULED    Osteoporosis 2008    Psychiatric disorder     ANXIETY AND DEPRESSION    PVD (peripheral vascular disease) ([facility] [address] 2/2014    W/STENT , DR Jerry Mosqueda    Seizures ([facility] [address] 2013    ? SEIZURE, CONFUSION, TROUBLE WITH SPEECH       Past Surgical History:   Procedure Laterality Date    HX CHOLECYSTECTOMY  2001    HX COLONOSCOPY  10/27/2015    HX CRANIOTOMY  6/25/14    meningioma r medial sphenoid wing    HX GYN  1984    Tubal pregnancy    HX HYSTERECTOMY      HX OTHER SURGICAL  2/27/2014    Dr. Cesar Claire  2/2014    ABD AORTIC STENT    VASCULAR SURGERY PROCEDURE UNLIST Right 6/10/14    CAROTID        Prior to Admission medications    Medication Sig Start Date End Date Taking? Authorizing Provider   pantoprazole (PROTONIX) 40 mg tablet Take 1 Tab by mouth daily.  3/28/18  Yes Janeth Morales MD   dextroamphetamine-amphetamine (ADDERALL) 30 mg tablet TAKE 1 TABLET BY MOUTH TWICE A DAY 8/10/17  Yes Historical Provider   citalopram (CELEXA) 20 mg tablet Take 10 mg by mouth daily. 8/28/17  Yes Historical Provider   atorvastatin (LIPITOR) 10 mg tablet TAKE 1 TABLET NIGHTLY 8/4/17  Yes Frankey Congo III, MD   aspirin delayed-release 81 mg tablet Take 81 mg by mouth nightly. Yes Historical Provider   cholecalciferol, vitamin D3, (VITAMIN D3) 2,000 unit Tab Take 2,000 Units by mouth daily. Yes Historical Provider   clonazePAM (KLONOPIN) 0.5 mg tablet Take 0.5 mg by mouth nightly. Yes Historical Provider       Social History     Social History    Marital status:      Spouse name: N/A    Number of children: N/A    Years of education: N/A     Occupational History    Not on file.      Social History Main Topics    Smoking status: Current Every Day Smoker     Packs/day: 0.25     Years: 43.00     Types: Cigarettes    Smokeless tobacco: Never Used      Comment: joined 2001 [facility] for [employer]  Alcohol use No    Drug use: No    Sexual activity: Yes     Partners: Male     Birth control/ protection: Surgical     Other Topics Concern    Not on file     Social History Narrative          ROS  Per HPI    Visit Vitals    /70    Pulse 97    Temp 98.7 °F (37.1 °C) (Oral)    Resp 18    Ht 5' 1\" (1.549 m)    Wt 149 lb 3.2 oz (67.7 kg)    SpO2 96%    BMI 28.19 kg/m2         Physical Exam   Physical Examination: General appearance - alert, well appearing, and in no distress  Chest - clear to auscultation, no wheezes, rales or rhonchi, symmetric air entry  Heart - normal rate, regular rhythm, normal S1, S2, no murmurs, rubs, clicks or gallops  Abdomen - soft, nontender, nondistended, no masses or organomegaly  Neurological - alert, oriented, normal speech, no focal findings or movement disorder noted  Musculoskeletal - no joint tenderness, deformity or swelling  Extremities - peripheral pulses normal, no pedal edema, no clubbing or cyanosis, Lilliam's sign negative bilaterally  Some mild tenderness over the flexor aspect of the right wrist.  Normal strength. Her left leg has no significant tenderness. Pulses are as above. Assessment/Plan:  Diagnoses and all orders for this visit:    1. Meningioma ([facility] [address]    2. Obstruction of right carotid artery -will have follow-up studies done this week and treated. LDL goal of 100. Will check her labs to be sure that this is met and adjust meds. 3. PVD (peripheral vascular disease) ([facility] [address] -? Recurrence in the left leg. She will get studies done this week with the vascular surgeon and let me know those results. -     METABOLIC PANEL, COMPREHENSIVE  -     LIPID PANEL  -     CBC WITH AUTOMATED DIFF    4. Carpal tunnel syndrome of right wrist -will treat with stretching exercises and a brace. She will let me know if not improving. Other orders  -     pantoprazole (PROTONIX) 40 mg tablet; Take 1 Tab by mouth daily. 5.  History of hepatitis C-resolved. She will have her liver function tests checked with us every 6 months as well as her lipids. Follow-up Disposition:  Return in about 6 months (around 9/28/2018). Advised her to call back or return to office if symptoms worsen/change/persist.  Discussed expected course/resolution/complications of diagnosis in detail with patient. Medication risks/benefits/costs/interactions/alternatives discussed with patient. She was given an after visit summary which includes diagnoses, current medications, & vitals. She expressed understanding with the diagnosis and plan.

## 2018-04-21 LAB
ALBUMIN SERPL-MCNC: 4 G/DL (ref 3.6–4.8)
ALBUMIN/GLOB SERPL: 1.7 {RATIO} (ref 1.2–2.2)
ALP SERPL-CCNC: 145 IU/L (ref 39–117)
ALT SERPL-CCNC: 9 IU/L (ref 0–32)
AST SERPL-CCNC: 18 IU/L (ref 0–40)
BASOPHILS # BLD AUTO: 0 X10E3/UL (ref 0–0.2)
BASOPHILS NFR BLD AUTO: 0 %
BILIRUB SERPL-MCNC: 0.3 MG/DL (ref 0–1.2)
BUN SERPL-MCNC: 14 MG/DL (ref 8–27)
BUN/CREAT SERPL: 17 (ref 12–28)
CALCIUM SERPL-MCNC: 8.8 MG/DL (ref 8.7–10.3)
CHLORIDE SERPL-SCNC: 98 MMOL/L (ref 96–106)
CHOLEST SERPL-MCNC: 155 MG/DL (ref 100–199)
CO2 SERPL-SCNC: 25 MMOL/L (ref 18–29)
CREAT SERPL-MCNC: 0.83 MG/DL (ref 0.57–1)
EOSINOPHIL # BLD AUTO: 0.1 X10E3/UL (ref 0–0.4)
EOSINOPHIL NFR BLD AUTO: 1 %
ERYTHROCYTE [DISTWIDTH] IN BLOOD BY AUTOMATED COUNT: 13.7 % (ref 12.3–15.4)
GFR SERPLBLD CREATININE-BSD FMLA CKD-EPI: 77 ML/MIN/1.73
GFR SERPLBLD CREATININE-BSD FMLA CKD-EPI: 89 ML/MIN/1.73
GLOBULIN SER CALC-MCNC: 2.4 G/DL (ref 1.5–4.5)
GLUCOSE SERPL-MCNC: 84 MG/DL (ref 65–99)
HCT VFR BLD AUTO: 39 % (ref 34–46.6)
HDLC SERPL-MCNC: 43 MG/DL
HGB BLD-MCNC: 12.9 G/DL (ref 11.1–15.9)
IMM GRANULOCYTES # BLD: 0 X10E3/UL (ref 0–0.1)
IMM GRANULOCYTES NFR BLD: 0 %
LDLC SERPL CALC-MCNC: 98 MG/DL (ref 0–99)
LYMPHOCYTES # BLD AUTO: 1.8 X10E3/UL (ref 0.7–3.1)
LYMPHOCYTES NFR BLD AUTO: 22 %
MCH RBC QN AUTO: 29.2 PG (ref 26.6–33)
MCHC RBC AUTO-ENTMCNC: 33.1 G/DL (ref 31.5–35.7)
MCV RBC AUTO: 88 FL (ref 79–97)
MONOCYTES # BLD AUTO: 0.6 X10E3/UL (ref 0.1–0.9)
MONOCYTES NFR BLD AUTO: 7 %
NEUTROPHILS # BLD AUTO: 5.8 X10E3/UL (ref 1.4–7)
NEUTROPHILS NFR BLD AUTO: 70 %
PLATELET # BLD AUTO: 213 X10E3/UL (ref 150–379)
POTASSIUM SERPL-SCNC: 4.4 MMOL/L (ref 3.5–5.2)
PROT SERPL-MCNC: 6.4 G/DL (ref 6–8.5)
RBC # BLD AUTO: 4.42 X10E6/UL (ref 3.77–5.28)
SODIUM SERPL-SCNC: 140 MMOL/L (ref 134–144)
TRIGL SERPL-MCNC: 72 MG/DL (ref 0–149)
VLDLC SERPL CALC-MCNC: 14 MG/DL (ref 5–40)
WBC # BLD AUTO: 8.3 X10E3/UL (ref 3.4–10.8)

## 2018-07-31 RX ORDER — ATORVASTATIN CALCIUM 10 MG/1
TABLET, FILM COATED ORAL
Qty: 90 TAB | Refills: 3 | Status: SHIPPED | OUTPATIENT
Start: 2018-07-31 | End: 2019-10-11 | Stop reason: SDUPTHER

## 2018-09-28 ENCOUNTER — HOSPITAL ENCOUNTER (OUTPATIENT)
Dept: MAMMOGRAPHY | Age: 63
Discharge: HOME | End: 2018-09-28
Attending: INTERNAL MEDICINE
Payer: COMMERCIAL

## 2018-09-28 DIAGNOSIS — Z12.39 BREAST SCREENING: ICD-10-CM

## 2018-09-28 PROCEDURE — 77067 SCR MAMMO BI INCL CAD: CPT

## 2018-10-05 ENCOUNTER — OFFICE VISIT (OUTPATIENT)
Dept: INTERNAL MEDICINE CLINIC | Age: 63
End: 2018-10-05

## 2018-10-05 VITALS
RESPIRATION RATE: 18 BRPM | HEART RATE: 90 BPM | HEIGHT: 61 IN | WEIGHT: 155 LBS | SYSTOLIC BLOOD PRESSURE: 117 MMHG | DIASTOLIC BLOOD PRESSURE: 75 MMHG | TEMPERATURE: 98.6 F | BODY MASS INDEX: 29.27 KG/M2 | OXYGEN SATURATION: 99 %

## 2018-10-05 DIAGNOSIS — F32.1 MODERATE MAJOR DEPRESSION (HCC): ICD-10-CM

## 2018-10-05 DIAGNOSIS — D49.6 EXTRA-AXIAL BRAIN TUMOR (HCC): ICD-10-CM

## 2018-10-05 DIAGNOSIS — Z00.00 ROUTINE GENERAL MEDICAL EXAMINATION AT A HEALTH CARE FACILITY: Primary | ICD-10-CM

## 2018-10-05 DIAGNOSIS — D32.9 MENINGIOMA (HCC): ICD-10-CM

## 2018-10-05 DIAGNOSIS — Z23 ENCOUNTER FOR IMMUNIZATION: ICD-10-CM

## 2018-10-05 DIAGNOSIS — B18.2 CHRONIC HEPATITIS C WITHOUT HEPATIC COMA (HCC): ICD-10-CM

## 2018-10-05 DIAGNOSIS — I63.9 CEREBROVASCULAR ACCIDENT (CVA), UNSPECIFIED MECHANISM (HCC): ICD-10-CM

## 2018-10-05 DIAGNOSIS — E78.2 MIXED HYPERLIPIDEMIA: ICD-10-CM

## 2018-10-05 DIAGNOSIS — I65.21 OBSTRUCTION OF RIGHT CAROTID ARTERY: ICD-10-CM

## 2018-10-05 RX ORDER — ESOMEPRAZOLE MAGNESIUM 40 MG/1
40 CAPSULE, DELAYED RELEASE ORAL DAILY
COMMUNITY
Start: 2018-10-05 | End: 2019-02-18 | Stop reason: SDUPTHER

## 2018-10-05 RX ORDER — CITALOPRAM 10 MG/1
TABLET ORAL
COMMUNITY
Start: 2018-09-16

## 2018-10-05 NOTE — MR AVS SNAPSHOT
[address] [place] [address] [address] [place] [address] 
[phone] Patient: Ricci Dubon MRN:  [id number] Visit Information Date & Time Provider Department Dept. Phone Encounter #  
 10/5/2018  4:30 PM Octavio Nettles MD [facility] Internal Medicine [phone] [id number] Upcoming Health Maintenance Date Due Shingrix Vaccine Age 50> (1 of 2) 9/15/2007 Influenza Age 5 to Adult 8/1/2018 PAP AKA CERVICAL CYTOLOGY 8/31/2020 BREAST CANCER SCRN MAMMOGRAM 9/28/2020 COLONOSCOPY 10/27/2020 DTaP/Tdap/Td series (2 - Td) 9/14/2022 Allergies as of 10/5/2018  Review Complete On: 10/5/2018 By: Octavio Nettles MD  
  
 Severity Noted Reaction Type Reactions Tetracycline Medium 01/27/2010   Side Effect Other (comments) GI upset Current Immunizations  Reviewed on 10/5/2018 Name Date Influenza High Dose Vaccine PF  Deferred (Medication not available) Influenza Vaccine 11/6/2017, 11/4/2014 Influenza Vaccine (Quad) PF 10/5/2018 Influenza Vaccine Split 11/15/2010 Pneumococcal Polysaccharide (PPSV-23) 2/4/2015  2:52 PM,  Deferred (Patient Refused) TDAP Vaccine 9/14/2012 Zoster Vaccine, Live 11/6/2017 Reviewed by Salvatore Baron LPN on 23/5/9518 at  5:34 PM  
You Were Diagnosed With   
  
 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[facility])     ICD-10-CM: D49.6 ICD-9-CM: 239.6 Cerebrovascular accident (CVA), unspecified mechanism ([facility] [address]     ICD-10-CM: I63.9 ICD-9-CM: 434.91 Encounter for immunization     ICD-10-CM: O56 ICD-9-CM: V03.89 Vitals BP Pulse Temp Resp Height(growth percentile) Weight(growth percentile) 117/75 (BP 1 Location: Left arm, BP Patient Position: Sitting) 90 98.6 °F (37 °C) (Oral) 18 5' 1\" (1.549 m) 155 lb (70.3 kg) SpO2 BMI OB Status Smoking Status 99% 29.29 kg/m2 Hysterectomy Current Every Day Smoker BMI and BSA Data Body Mass Index Body Surface Area  
 29.29 kg/m 2 1.74 m 2 Preferred Pharmacy Pharmacy Name Phone [facility] [address] [place] [address] [phone] Your Updated Medication List  
  
   
This list is accurate as of 10/5/18  5:35 PM.  Always use your most recent med list.  
  
  
  
  
 aspirin delayed-release 81 mg tablet Take 81 mg by mouth nightly. atorvastatin 10 mg tablet Commonly known as:  LIPITOR  
TAKE 1 TABLET NIGHTLY  
  
 citalopram 10 mg tablet Commonly known as:  CELEXA  
  
 dextroamphetamine-amphetamine 30 mg tablet Commonly known as:  ADDERALL  
TAKE 1 TABLET BY MOUTH TWICE A DAY  
  
 esomeprazole 40 mg capsule Commonly known as:  Amy Rush Take 1 Cap by mouth daily. KlonoPIN 0.5 mg tablet Generic drug:  clonazePAM  
Take 0.5 mg by mouth nightly. varicella-zoster recombinant (PF) 50 mcg/0.5 mL Susr injection Commonly known as:  SHINGRIX  
0.5 mL by IntraMUSCular route once for 1 dose. VITAMIN D3 2,000 unit Tab Generic drug:  cholecalciferol (vitamin D3) Take 2,000 Units by mouth daily. Prescriptions Printed Refills  
 varicella-zoster recombinant, PF, (SHINGRIX) 50 mcg/0.5 mL susr injection 1 Si.5 mL by IntraMUSCular route once for 1 dose. Class: Print Route: IntraMUSCular We Performed the Following CBC WITH AUTOMATED DIFF [13545 CPT(R)] INFLUENZA VIRUS VAC QUAD,SPLIT,PRESV FREE SYRINGE IM [id number] CPT(R)] LIPID PANEL [25729 CPT(R)] METABOLIC PANEL, COMPREHENSIVE [08979 CPT(R)] TSH RFX ON ABNORMAL TO FREE T4 [[id number] Custom] Patient Instructions Vaccine Information Statement Influenza (Flu) Vaccine (Inactivated or Recombinant): What you need to know Many Vaccine Information Statements are available in Persian and other languages. See www.immunize.org/vis Hojas de Información Sobre Vacunas están disponibles en Español y en muchos otros idiomas. Visite www.immunize.org/vis 1. Why get vaccinated? Influenza (flu) is a contagious disease that spreads around the [facility] every year, usually between October and May. Flu is caused by influenza viruses, and is spread mainly by coughing, sneezing, and close contact. Anyone can get flu. Flu strikes suddenly and can last several days. Symptoms vary by age, but can include: 
 fever/chills  sore throat  muscle aches  fatigue  cough  headache  runny or stuffy nose Flu can also lead to pneumonia and blood infections, and cause diarrhea and seizures in children. If you have a medical condition, such as heart or lung disease, flu can make it worse. Flu is more dangerous for some people. Infants and young children, people 72years of age and older, pregnant women, and people with certain health conditions or a weakened immune system are at greatest risk. Each year thousands of people in the [facility] die from flu, and many more are hospitalized. Flu vaccine can: 
 keep you from getting flu, 
 make flu less severe if you do get it, and 
 keep you from spreading flu to your family and other people. 2. Inactivated and recombinant flu vaccines A dose of flu vaccine is recommended every flu season. Children 6 months through 6years of age may need two doses during the same flu season. Everyone else needs only one dose each flu season. Some inactivated flu vaccines contain a very small amount of a mercury-based preservative called thimerosal. Studies have not shown thimerosal in vaccines to be harmful, but flu vaccines that do not contain thimerosal are available. There is no live flu virus in flu shots. They cannot cause the flu. There are many flu viruses, and they are always changing. Each year a new flu vaccine is made to protect against three or four viruses that are likely to cause disease in the upcoming flu season. But even when the vaccine doesnt exactly match these viruses, it may still provide some protection Flu vaccine cannot prevent: 
 flu that is caused by a virus not covered by the vaccine, or 
 illnesses that look like flu but are not. It takes about 2 weeks for protection to develop after vaccination, and protection lasts through the flu season. 3. Some people should not get this vaccine Tell the person who is giving you the vaccine:  If you have any severe, life-threatening allergies. If you ever had a life-threatening allergic reaction after a dose of flu vaccine, or have a severe allergy to any part of this vaccine, you may be advised not to get vaccinated. Most, but not all, types of flu vaccine contain a small amount of egg protein.  If you ever had Guillain-Barré Syndrome (also called GBS). Some people with a history of GBS should not get this vaccine. This should be discussed with your doctor.  If you are not feeling well. It is usually okay to get flu vaccine when you have a mild illness, but you might be asked to come back when you feel better. 4. Risks of a vaccine reaction With any medicine, including vaccines, there is a chance of reactions. These are usually mild and go away on their own, but serious reactions are also possible. Most people who get a flu shot do not have any problems with it. Minor problems following a flu shot include:  
 soreness, redness, or swelling where the shot was given  hoarseness  sore, red or itchy eyes  cough  fever  aches  headache  itching  fatigue If these problems occur, they usually begin soon after the shot and last 1 or 2 days. More serious problems following a flu shot can include the following:  There may be a small increased risk of Guillain-Barré Syndrome (GBS) after inactivated flu vaccine. This risk has been estimated at 1 or 2 additional cases per million people vaccinated. This is much lower than the risk of severe complications from flu, which can be prevented by flu vaccine.  Young children who get the flu shot along with pneumococcal vaccine (PCV13) and/or DTaP vaccine at the same time might be slightly more likely to have a seizure caused by fever. Ask your doctor for more information. Tell your doctor if a child who is getting flu vaccine has ever had a seizure. Problems that could happen after any injected vaccine:  People sometimes faint after a medical procedure, including vaccination. Sitting or lying down for about 15 minutes can help prevent fainting, and injuries caused by a fall. Tell your doctor if you feel dizzy, or have vision changes or ringing in the ears.  Some people get severe pain in the shoulder and have difficulty moving the arm where a shot was given. This happens very rarely.  Any medication can cause a severe allergic reaction. Such reactions from a vaccine are very rare, estimated at about 1 in a million doses, and would happen within a few minutes to a few hours after the vaccination. As with any medicine, there is a very remote chance of a vaccine causing a serious injury or death. The safety of vaccines is always being monitored. For more information, visit: www.cdc.gov/vaccinesafety/ 
 
5. What if there is a serious reaction? What should I look for?  Look for anything that concerns you, such as signs of a severe allergic reaction, very high fever, or unusual behavior. Signs of a severe allergic reaction can include hives, swelling of the face and throat, difficulty breathing, a fast heartbeat, dizziness, and weakness  usually within a few minutes to a few hours after the vaccination. What should I do?  If you think it is a severe allergic reaction or other emergency that cant wait, call 9-1-1 and get the person to the nearest hospital. Otherwise, call your doctor.  Reactions should be reported to the Vaccine Adverse Event Reporting System (VAERS). Your doctor should file this report, or you can do it yourself through  the VAERS web site at www.vaers. [facility].gov, or by calling 8-780.393.2706. VAERS does not give medical advice. 6. The National Vaccine Injury Compensation Program 
 
The [facility] Vaccine Injury Compensation Program (VICP) is a federal program that was created to compensate people who may have been injured by certain vaccines. Persons who believe they may have been injured by a vaccine can learn about the program and about filing a claim by calling 1-853.297.3505 or visiting the [address] website at www.[facility].gov/vaccinecompensation. There is a time limit to file a claim for compensation. 7. How can I learn more?  Ask your healthcare provider. He or she can give you the vaccine package insert or suggest other sources of information.  Call your local or state health department.  Contact the Centers for Disease Control and Prevention (CDC): 
- Call 0-898.879.8216 (1-800-CDC-INFO) or 
- Visit CDCs website at www.cdc.gov/flu Vaccine Information Statement Inactivated Influenza Vaccine 8/7/2015 
42 ZENYLizeth Galeas 903UP-12 Department of [facility] and [employer] Centers for Disease Control and Prevention Office Use Only Shoulder Bursitis: Exercises Your Care Instructions Here are some examples of typical rehabilitation exercises for your condition. Start each exercise slowly. Ease off the exercise if you start to have pain. Your doctor or physical therapist will tell you when you can start these exercises and which ones will work best for you. How to do the exercises Posterior stretching exercise 1. Hold the elbow of your injured arm with your other hand. 2. Use your hand to pull your injured arm gently up and across your body. You will feel a gentle stretch across the back of your injured shoulder. 3. Hold for at least 15 to 30 seconds. Then slowly lower your arm. 4. Repeat 2 to 4 times. Up-the-back stretch 1. Put your hand in your back pocket. Let it rest there to stretch your shoulder. 2. With your other hand, hold your injured arm (palm outward) behind your back by the wrist. Pull your arm up gently to stretch your shoulder. 3. Next, put a towel over your other shoulder. Put the hand of your injured arm behind your back. Now hold the back end of the towel. With the other hand, hold the front end of the towel in front of your body. Pull gently on the front end of the towel. This will bring your hand farther up your back to stretch your shoulder. Overhead stretch 1. Standing about an arm's length away, grasp onto a solid surface. You could use a countertop, a doorknob, or the back of a sturdy chair. 2. With your knees slightly bent, bend forward with your arms straight. Lower your upper body, and let your shoulders stretch. 3. As your shoulders are able to stretch farther, you may need to take a step or two backward. 4. Hold for at least 15 to 30 seconds. Then stand up and relax. If you had stepped back during your stretch, step forward so you can keep your hands on the solid surface. 5. Repeat 2 to 4 times. Shoulder flexion (lying down) 1. Lie on your back, holding a wand with both hands. Your palms should face down as you hold the wand. 2. Keeping your elbows straight, slowly raise your arms over your head. Raise them until you feel a stretch in your shoulders, upper back, and chest. 
3. Hold for 15 to 30 seconds. 4. Repeat 2 to 4 times. Shoulder rotation (lying down) 1. Lie on your back. Hold a wand with both hands with your elbows bent and palms up. 2. Keep your elbows close to your body, and move the wand across your body toward the sore arm. 3. Hold for 8 to 12 seconds. 4. Repeat 2 to 4 times. Shoulder blade squeeze 1. Stand with your arms at your sides, and squeeze your shoulder blades together. Do not raise your shoulders up as you squeeze. 2. Hold 6 seconds. 3. Repeat 8 to 12 times. Shoulder flexor and extensor exercise 1. Push forward (flex): Stand facing a wall or doorjamb, about 6 inches or less back. Hold your injured arm against your body. Make a closed fist with your thumb on top. Then gently push your hand forward into the wall with about 25% to 50% of your strength. Don't let your body move backward as you push. Hold for about 6 seconds. Relax for a few seconds. Repeat 8 to 12 times. 2. Push backward (extend): Stand with your back flat against a wall. Your upper arm should be against the wall, with your elbow bent 90 degrees (your hand straight ahead). Push your elbow gently back against the wall with about 25% to 50% of your strength. Don't let your body move forward as you push. Hold for about 6 seconds. Relax for a few seconds. Repeat 8 to 12 times. Scapular exercise: Wall push-ups 1. Stand facing a wall, about 12 inches to 18 inches away. 2. Place your hands on the wall at shoulder height. 3. Slowly bend your elbows and bring your face to the wall. Keep your back and hips straight. 4. Push back to where you started. 5. Repeat 8 to 12 times. 6. When you can do this exercise against a wall comfortably, you can try it against a counter.  You can then slowly progress to the end of a couch, then to a sturdy chair, and finally to the floor. Scapular exercise: Retraction 1. Put the band around a solid object at about waist level. (A bedpost will work well.) Each hand should hold an end of the band. 2. With your elbows at your sides and bent to 90 degrees, pull the band back. Your shoulder blades should move toward each other. Then move your arms back where you started. 3. Repeat 8 to 12 times. 4. If you have good range of motion in your shoulders, try this exercise with your arms lifted out to the sides. Keep your elbows at a 90-degree angle. Raise the elastic band up to about shoulder level. Pull the band back to move your shoulder blades toward each other. Then move your arms back where you started. Internal rotator strengthening exercise 1. Start by tying a piece of elastic exercise material to a doorknob. You can use surgical tubing or Thera-Band. 2. Stand or sit with your shoulder relaxed and your elbow bent 90 degrees. Your upper arm should rest comfortably against your side. Squeeze a rolled towel between your elbow and your body for comfort. This will help keep your arm at your side. 3. Hold one end of the elastic band in the hand of the painful arm. 4. Slowly rotate your forearm toward your body until it touches your belly. Slowly move it back to where you started. 5. Keep your elbow and upper arm firmly tucked against the towel roll or at your side. 6. Repeat 8 to 12 times. External rotator strengthening exercise 1. Start by tying a piece of elastic exercise material to a doorknob. You can use surgical tubing or Thera-Band. (You may also hold one end of the band in each hand.) 2. Stand or sit with your shoulder relaxed and your elbow bent 90 degrees. Your upper arm should rest comfortably against your side. Squeeze a rolled towel between your elbow and your body for comfort. This will help keep your arm at your side. 3. Hold one end of the elastic band with the hand of the painful arm. 4. Start with your forearm across your belly. Slowly rotate the forearm out away from your body. Keep your elbow and upper arm tucked against the towel roll or the side of your body until you begin to feel tightness in your shoulder. Slowly move your arm back to where you started. 5. Repeat 8 to 12 times. Follow-up care is a key part of your treatment and safety. Be sure to make and go to all appointments, and call your doctor if you are having problems. It's also a good idea to know your test results and keep a list of the medicines you take. Where can you learn more? Go to http://akira-francia.info/. Enter B122 in the search box to learn more about \"Shoulder Bursitis: Exercises. \" Current as of: November 29, 2017 Content Version: 11.8 © 7888-9708 [employer]. Care instructions adapted under license by [employer] (which disclaims liability or warranty for this information). If you have questions about a medical condition or this instruction, always ask your healthcare professional. [place] [address] any warranty or liability for your use of this information. Hip Bursitis: Exercises Your Care Instructions Here are some examples of typical rehabilitation exercises for your condition. Start each exercise slowly. Ease off the exercise if you start to have pain. Your doctor or physical therapist will tell you when you can start these exercises and which ones will work best for you. How to do the exercises Hip rotator stretch 5. Lie on your back with both knees bent and your feet flat on the floor. 6. Put the ankle of your affected leg on your opposite thigh near your knee. 7. Use your hand to gently push your knee away from your body until you feel a gentle stretch around your hip. 8. Hold the stretch for 15 to 30 seconds. 9. Repeat 2 to 4 times. 10. Repeat steps 1 through 5, but this time use your hand to gently pull your knee toward your opposite shoulder. Iliotibial band stretch 4. Lean sideways against a wall. If you are not steady on your feet, hold on to a chair or counter. 5. Stand on the leg with the affected hip, with that leg close to the wall. Then cross your other leg in front of it. 6. Let your affected hip drop out to the side of your body and against wall. Then lean away from your affected hip until you feel a stretch. 7. Hold the stretch for 15 to 30 seconds. 8. Repeat 2 to 4 times. Straight-leg raises to the outside 6. Lie on your side, with your affected hip on top. 7. Tighten the front thigh muscles of your top leg to keep your knee straight. 8. Keep your hip and your leg straight in line with the rest of your body, and keep your knee pointing forward. Do not drop your hip back. 9. Lift your top leg straight up toward the ceiling, about 12 inches off the floor. Hold for about 6 seconds, then slowly lower your leg. 10. Repeat 8 to 12 times. Clamshell 5. Lie on your side, with your affected hip on top and your head propped on a pillow. Keep your feet and knees together and your knees bent. 6. Raise your top knee, but keep your feet together. Do not let your hips roll back. Your legs should open up like a clamshell. 7. Hold for 6 seconds. 8. Slowly lower your knee back down. Rest for 10 seconds. 9. Repeat 8 to 12 times. Follow-up care is a key part of your treatment and safety. Be sure to make and go to all appointments, and call your doctor if you are having problems. It's also a good idea to know your test results and keep a list of the medicines you take. Where can you learn more? Go to http://akira-francia.info/. Enter K323 in the search box to learn more about \"Hip Bursitis: Exercises. \" Current as of: November 29, 2017 Content Version: 11.8 © 6574-7812 [employer]. Care instructions adapted under license by [employer] (which disclaims liability or warranty for this information). If you have questions about a medical condition or this instruction, always ask your healthcare professional. [address] any warranty or liability for your use of this information. Introducing [facility]! Dear Faina Abel: Thank you for requesting a Diagnople[employer] account. Our records indicate that you already have an active Diagnoplex account. You can access your account anytime at [email or web address]. [email or web address] Did you know that you can access your hospital and ER discharge instructions at any time in Diagnoplex? You can also review all of your test results from your hospital stay or ER visit. Additional Information If you have questions, please visit the Frequently Asked Questions section of the Diagnoplex website at [email or web address] Remember, Diagnoplex is NOT to be used for urgent needs. For medical emergencies, dial 911. Now available from your iPhone and Android! Please provide this summary of care documentation to your next provider. Your primary care clinician is listed as Luis [phone] If you have any questions after today's visit, please call [phone].

## 2018-10-05 NOTE — PATIENT INSTRUCTIONS
Vaccine Information Statement    Influenza (Flu) Vaccine (Inactivated or Recombinant): What you need to know    Many Vaccine Information Statements are available in German and other languages. See www.immunize.org/vis  Hojas de Información Sobre Vacunas están disponibles en Español y en muchos otros idiomas. Visite www.immunize.org/vis    1. Why get vaccinated? Influenza (flu) is a contagious disease that spreads around the [place] every year, usually between October and May. Flu is caused by influenza viruses, and is spread mainly by coughing, sneezing, and close contact. Anyone can get flu. Flu strikes suddenly and can last several days. Symptoms vary by age, but can include:   fever/chills   sore throat   muscle aches   fatigue   cough   headache    runny or stuffy nose    Flu can also lead to pneumonia and blood infections, and cause diarrhea and seizures in children. If you have a medical condition, such as heart or lung disease, flu can make it worse. Flu is more dangerous for some people. Infants and young children, people 72years of age and older, pregnant women, and people with certain health conditions or a weakened immune system are at greatest risk. Each year thousands of people in the [facility] die from flu, and many more are hospitalized. Flu vaccine can:   keep you from getting flu,   make flu less severe if you do get it, and   keep you from spreading flu to your family and other people. 2. Inactivated and recombinant flu vaccines    A dose of flu vaccine is recommended every flu season. Children 6 months through 6years of age may need two doses during the same flu season. Everyone else needs only one dose each flu season.        Some inactivated flu vaccines contain a very small amount of a mercury-based preservative called thimerosal. Studies have not shown thimerosal in vaccines to be harmful, but flu vaccines that do not contain thimerosal are available. There is no live flu virus in flu shots. They cannot cause the flu. There are many flu viruses, and they are always changing. Each year a new flu vaccine is made to protect against three or four viruses that are likely to cause disease in the upcoming flu season. But even when the vaccine doesnt exactly match these viruses, it may still provide some protection    Flu vaccine cannot prevent:   flu that is caused by a virus not covered by the vaccine, or   illnesses that look like flu but are not. It takes about 2 weeks for protection to develop after vaccination, and protection lasts through the flu season. 3. Some people should not get this vaccine    Tell the person who is giving you the vaccine:     If you have any severe, life-threatening allergies. If you ever had a life-threatening allergic reaction after a dose of flu vaccine, or have a severe allergy to any part of this vaccine, you may be advised not to get vaccinated. Most, but not all, types of flu vaccine contain a small amount of egg protein.  If you ever had Guillain-Barré Syndrome (also called GBS). Some people with a history of GBS should not get this vaccine. This should be discussed with your doctor.  If you are not feeling well. It is usually okay to get flu vaccine when you have a mild illness, but you might be asked to come back when you feel better. 4. Risks of a vaccine reaction    With any medicine, including vaccines, there is a chance of reactions. These are usually mild and go away on their own, but serious reactions are also possible. Most people who get a flu shot do not have any problems with it.      Minor problems following a flu shot include:    soreness, redness, or swelling where the shot was given     hoarseness   sore, red or itchy eyes   cough   fever   aches   headache   itching   fatigue  If these problems occur, they usually begin soon after the shot and last 1 or 2 days. More serious problems following a flu shot can include the following:     There may be a small increased risk of Guillain-Barré Syndrome (GBS) after inactivated flu vaccine. This risk has been estimated at 1 or 2 additional cases per million people vaccinated. This is much lower than the risk of severe complications from flu, which can be prevented by flu vaccine.  Young children who get the flu shot along with pneumococcal vaccine (PCV13) and/or DTaP vaccine at the same time might be slightly more likely to have a seizure caused by fever. Ask your doctor for more information. Tell your doctor if a child who is getting flu vaccine has ever had a seizure. Problems that could happen after any injected vaccine:      People sometimes faint after a medical procedure, including vaccination. Sitting or lying down for about 15 minutes can help prevent fainting, and injuries caused by a fall. Tell your doctor if you feel dizzy, or have vision changes or ringing in the ears.  Some people get severe pain in the shoulder and have difficulty moving the arm where a shot was given. This happens very rarely.  Any medication can cause a severe allergic reaction. Such reactions from a vaccine are very rare, estimated at about 1 in a million doses, and would happen within a few minutes to a few hours after the vaccination. As with any medicine, there is a very remote chance of a vaccine causing a serious injury or death. The safety of vaccines is always being monitored. For more information, visit: www.cdc.gov/vaccinesafety/    5. What if there is a serious reaction? What should I look for?  Look for anything that concerns you, such as signs of a severe allergic reaction, very high fever, or unusual behavior.     Signs of a severe allergic reaction can include hives, swelling of the face and throat, difficulty breathing, a fast heartbeat, dizziness, and weakness - usually within a few minutes to a few hours after the vaccination. What should I do?  If you think it is a severe allergic reaction or other emergency that cant wait, call 9-1-1 and get the person to the nearest hospital. Otherwise, call your doctor.  Reactions should be reported to the Vaccine Adverse Event Reporting System (VAERS). Your doctor should file this report, or you can do it yourself through  the VAERS web site at www.vaers. [facility].gov, or by calling 7-290.719.1577. VAERS does not give medical advice. 6. The National Vaccine Injury Compensation Program    The [facility] Vaccine Injury Compensation Program (VICP) is a federal program that was created to compensate people who may have been injured by certain vaccines. Persons who believe they may have been injured by a vaccine can learn about the program and about filing a claim by calling 7-557.955.8554 or visiting the [employer] website at www.[facility].gov/vaccinecompensation. There is a time limit to file a claim for compensation. 7. How can I learn more?  Ask your healthcare provider. He or she can give you the vaccine package insert or suggest other sources of information.  Call your local or state health department.  Contact the Centers for Disease Control and Prevention (CDC):  - Call 8-714.904.7938 (1-800-CDC-INFO) or  - Visit CDCs website at www.cdc.gov/flu    Vaccine Information Statement   Inactivated Influenza Vaccine   8/7/2015  44 . [place] [id number]    Department of Health and Human Services  Centers for Disease Control and Prevention    Office Use Only       Shoulder Bursitis: Exercises  Your Care Instructions  Here are some examples of typical rehabilitation exercises for your condition. Start each exercise slowly. Ease off the exercise if you start to have pain. Your doctor or physical therapist will tell you when you can start these exercises and which ones will work best for you.   How to do the exercises  Posterior stretching exercise    1. Hold the elbow of your injured arm with your other hand. 2. Use your hand to pull your injured arm gently up and across your body. You will feel a gentle stretch across the back of your injured shoulder. 3. Hold for at least 15 to 30 seconds. Then slowly lower your arm. 4. Repeat 2 to 4 times. Up-the-back stretch    1. Put your hand in your back pocket. Let it rest there to stretch your shoulder. 2. With your other hand, hold your injured arm (palm outward) behind your back by the wrist. Pull your arm up gently to stretch your shoulder. 3. Next, put a towel over your other shoulder. Put the hand of your injured arm behind your back. Now hold the back end of the towel. With the other hand, hold the front end of the towel in front of your body. Pull gently on the front end of the towel. This will bring your hand farther up your back to stretch your shoulder. Overhead stretch    1. Standing about an arm's length away, grasp onto a solid surface. You could use a countertop, a doorknob, or the back of a sturdy chair. 2. With your knees slightly bent, bend forward with your arms straight. Lower your upper body, and let your shoulders stretch. 3. As your shoulders are able to stretch farther, you may need to take a step or two backward. 4. Hold for at least 15 to 30 seconds. Then stand up and relax. If you had stepped back during your stretch, step forward so you can keep your hands on the solid surface. 5. Repeat 2 to 4 times. Shoulder flexion (lying down)    1. Lie on your back, holding a wand with both hands. Your palms should face down as you hold the wand. 2. Keeping your elbows straight, slowly raise your arms over your head. Raise them until you feel a stretch in your shoulders, upper back, and chest.  3. Hold for 15 to 30 seconds. 4. Repeat 2 to 4 times. Shoulder rotation (lying down)    1. Lie on your back. Hold a wand with both hands with your elbows bent and palms up.   2. Keep your elbows close to your body, and move the wand across your body toward the sore arm. 3. Hold for 8 to 12 seconds. 4. Repeat 2 to 4 times. Shoulder blade squeeze    1. Stand with your arms at your sides, and squeeze your shoulder blades together. Do not raise your shoulders up as you squeeze. 2. Hold 6 seconds. 3. Repeat 8 to 12 times. Shoulder flexor and extensor exercise    1. Push forward (flex): Stand facing a wall or doorjamb, about 6 inches or less back. Hold your injured arm against your body. Make a closed fist with your thumb on top. Then gently push your hand forward into the wall with about 25% to 50% of your strength. Don't let your body move backward as you push. Hold for about 6 seconds. Relax for a few seconds. Repeat 8 to 12 times. 2. Push backward (extend): Stand with your back flat against a wall. Your upper arm should be against the wall, with your elbow bent 90 degrees (your hand straight ahead). Push your elbow gently back against the wall with about 25% to 50% of your strength. Don't let your body move forward as you push. Hold for about 6 seconds. Relax for a few seconds. Repeat 8 to 12 times. Scapular exercise: Wall push-ups    1. Stand facing a wall, about 12 inches to 18 inches away. 2. Place your hands on the wall at shoulder height. 3. Slowly bend your elbows and bring your face to the wall. Keep your back and hips straight. 4. Push back to where you started. 5. Repeat 8 to 12 times. 6. When you can do this exercise against a wall comfortably, you can try it against a counter. You can then slowly progress to the end of a couch, then to a sturdy chair, and finally to the floor. Scapular exercise: Retraction    1. Put the band around a solid object at about waist level. (A bedpost will work well.) Each hand should hold an end of the band. 2. With your elbows at your sides and bent to 90 degrees, pull the band back. Your shoulder blades should move toward each other.  Then move your arms back where you started. 3. Repeat 8 to 12 times. 4. If you have good range of motion in your shoulders, try this exercise with your arms lifted out to the sides. Keep your elbows at a 90-degree angle. Raise the elastic band up to about shoulder level. Pull the band back to move your shoulder blades toward each other. Then move your arms back where you started. Internal rotator strengthening exercise    1. Start by tying a piece of elastic exercise material to a doorknob. You can use surgical tubing or Thera-Band. 2. Stand or sit with your shoulder relaxed and your elbow bent 90 degrees. Your upper arm should rest comfortably against your side. Squeeze a rolled towel between your elbow and your body for comfort. This will help keep your arm at your side. 3. Hold one end of the elastic band in the hand of the painful arm. 4. Slowly rotate your forearm toward your body until it touches your belly. Slowly move it back to where you started. 5. Keep your elbow and upper arm firmly tucked against the towel roll or at your side. 6. Repeat 8 to 12 times. External rotator strengthening exercise    1. Start by tying a piece of elastic exercise material to a doorknob. You can use surgical tubing or Thera-Band. (You may also hold one end of the band in each hand.)  2. Stand or sit with your shoulder relaxed and your elbow bent 90 degrees. Your upper arm should rest comfortably against your side. Squeeze a rolled towel between your elbow and your body for comfort. This will help keep your arm at your side. 3. Hold one end of the elastic band with the hand of the painful arm. 4. Start with your forearm across your belly. Slowly rotate the forearm out away from your body. Keep your elbow and upper arm tucked against the towel roll or the side of your body until you begin to feel tightness in your shoulder. Slowly move your arm back to where you started. 5. Repeat 8 to 12 times.   Follow-up care is a key part of your treatment and safety. Be sure to make and go to all appointments, and call your doctor if you are having problems. It's also a good idea to know your test results and keep a list of the medicines you take. Where can you learn more? Go to http://akira-francia.info/. Enter Y561 in the search box to learn more about \"Shoulder Bursitis: Exercises. \"  Current as of: November 29, 2017  Content Version: 11.8  © 3899-8708 [employer]. Care instructions adapted under license by [employer] (which disclaims liability or warranty for this information). If you have questions about a medical condition or this instruction, always ask your healthcare professional. [address] any warranty or liability for your use of this information. Hip Bursitis: Exercises  Your Care Instructions  Here are some examples of typical rehabilitation exercises for your condition. Start each exercise slowly. Ease off the exercise if you start to have pain. Your doctor or physical therapist will tell you when you can start these exercises and which ones will work best for you. How to do the exercises  Hip rotator stretch    5. Lie on your back with both knees bent and your feet flat on the floor. 6. Put the ankle of your affected leg on your opposite thigh near your knee. 7. Use your hand to gently push your knee away from your body until you feel a gentle stretch around your hip. 8. Hold the stretch for 15 to 30 seconds. 9. Repeat 2 to 4 times. 10. Repeat steps 1 through 5, but this time use your hand to gently pull your knee toward your opposite shoulder. Iliotibial band stretch    4. Lean sideways against a wall. If you are not steady on your feet, hold on to a chair or counter. 5. Stand on the leg with the affected hip, with that leg close to the wall. Then cross your other leg in front of it.   6. Let your affected hip drop out to the side of your body and against wall. Then lean away from your affected hip until you feel a stretch. 7. Hold the stretch for 15 to 30 seconds. 8. Repeat 2 to 4 times. Straight-leg raises to the outside    6. Lie on your side, with your affected hip on top. 7. Tighten the front thigh muscles of your top leg to keep your knee straight. 8. Keep your hip and your leg straight in line with the rest of your body, and keep your knee pointing forward. Do not drop your hip back. 9. Lift your top leg straight up toward the ceiling, about 12 inches off the floor. Hold for about 6 seconds, then slowly lower your leg. 10. Repeat 8 to 12 times. Clamshell    5. Lie on your side, with your affected hip on top and your head propped on a pillow. Keep your feet and knees together and your knees bent. 6. Raise your top knee, but keep your feet together. Do not let your hips roll back. Your legs should open up like a clamshell. 7. Hold for 6 seconds. 8. Slowly lower your knee back down. Rest for 10 seconds. 9. Repeat 8 to 12 times. Follow-up care is a key part of your treatment and safety. Be sure to make and go to all appointments, and call your doctor if you are having problems. It's also a good idea to know your test results and keep a list of the medicines you take. Where can you learn more? Go to http://akira-francia.info/. Enter M569 in the search box to learn more about \"Hip Bursitis: Exercises. \"  Current as of: November 29, 2017  Content Version: 11.8  © 8333-9785 [employer]. Care instructions adapted under license by [employer] (which disclaims liability or warranty for this information). If you have questions about a medical condition or this instruction, always ask your healthcare professional. [address] any warranty or liability for your use of this information.

## 2018-10-05 NOTE — PROGRESS NOTES
Identified pt with two pt identifiers(name and ). Reviewed record in preparation for visit and have obtained necessary documentation. Chief Complaint   Patient presents with    Complete Physical     skin tags in several areas and joint pain    Ear Pain     in both ears for 1 week    Urinary Hesitancy     for 4 months        Health Maintenance Due   Topic    Shingrix Vaccine Age 49> (1 of 2)    Influenza Age 5 to Adult        Coordination of Care Questionnaire:  :   1) Have you been to an emergency room, urgent care, or hospitalized since your last visit?   no   If yes, where when, and reason for visit? 2. Have seen or consulted any other health care provider since your last visit? NO  If yes, where when, and reason for visit? 3) Do you have an Advanced Directive/ Living Will in place? YES  If yes, do we have a copy on file NO  If no, would you like information NO    Patient is accompanied by self I have received verbal consent from Brisa Giordano to discuss any/all medical information while they are present in the room.     Visit Vitals    /75 (BP 1 Location: Left arm, BP Patient Position: Sitting)    Pulse 90    Temp 98.6 °F (37 °C) (Oral)    Resp 18    Ht 5' 1\" (1.549 m)    Wt 155 lb (70.3 kg)    SpO2 99%    BMI 29.29 kg/m2     Keith Grandchild, LPN

## 2018-10-06 NOTE — PROGRESS NOTES
Subjective:   66 y.o. female for Well Woman Check. Her gyne and breast care is done elsewhere by her Ob-Gyne physician. 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  varicella-zoster recombinant, PF, (SHINGRIX) 50 mcg/0.5 mL susr injection 0.5 mL by IntraMUSCular route once for 1 dose. 0.5 mL 1    atorvastatin (LIPITOR) 10 mg tablet TAKE 1 TABLET NIGHTLY 90 Tab 3    dextroamphetamine-amphetamine (ADDERALL) 30 mg tablet TAKE 1 TABLET BY MOUTH TWICE A DAY  0    aspirin delayed-release 81 mg tablet Take 81 mg by mouth nightly.  cholecalciferol, vitamin D3, (VITAMIN D3) 2,000 unit Tab Take 2,000 Units by mouth daily.  clonazePAM (KLONOPIN) 0.5 mg tablet Take 0.5 mg by mouth nightly. Allergies   Allergen Reactions    Tetracycline Other (comments)     GI upset     Past Medical History:   Diagnosis Date    Anxiety     CAD (coronary artery disease)     Colon polyps     Depression     GERD (gastroesophageal reflux disease)     Hemorrhoids     Hepatitis C     genotype 1    History of kidney stones     Hx of amaurosis fugax     right eye, 2014    Liver disease 2011    hep c, [place] 2011    Meningioma [facility]) 2014    SURGERY TO BE SCHEDULED    Osteoporosis 2008    Psychiatric disorder     ANXIETY AND DEPRESSION    PVD (peripheral vascular disease) ([facility] 2/2014    W/STENT , DR Isaias Oliveros    Seizures ([facility] 2013    ?  SEIZURE, CONFUSION, TROUBLE WITH SPEECH     Past Surgical History:   Procedure Laterality Date    HX CHOLECYSTECTOMY  2001    HX COLONOSCOPY  10/27/2015    HX CRANIOTOMY  6/25/14    meningioma r medial sphenoid wing    HX GYN  1984    Tubal pregnancy    HX HYSTERECTOMY      HX OTHER SURGICAL  2/27/2014    Dr. Megan Lindsay      [address]  2/2014    ABD AORTIC STENT    VASCULAR SURGERY PROCEDURE UNLIST Right 6/10/14    CAROTID      Family History   Problem Relation Age of Onset    Heart Disease Mother      Heart attack and triple bypass    Anxiety Mother     Cancer Mother      Lung    Heart Surgery Mother      CABG X3 VESSEL    Alcohol abuse Mother      Quit Drinking 1986    Hypertension Mother      on medication    Lung Disease Mother      COPD    Cancer Sister      vaginal    Other Sister      fibromyalgia    Diabetes Sister     Anxiety Sister     Alcohol abuse Father      Never stoped    Asthma Father      Had since a child    Asthma Sister      Had since a child    Cancer Sister      Vaginal    Diabetes Sister      Started later in life.     Heart Disease Sister      mild heart attach 2018    Hypertension Sister      on medication    Lung Disease Sister      COPD    Heart Disease Maternal Grandmother      heart aneurysm 46 of age 56    Heart Disease Paternal Grandmother      several heart attacks    Hypertension Paternal Grandmother      on medication    Stroke Paternal Grandmother      Multiple strokes in her 66's    Heart Disease Paternal Grandfather      major heart attack cause of death    Lung Disease Maternal Grandfather      COPD    Anesth Problems Neg Hx      Social History   Substance Use Topics    Smoking status: Current Every Day Smoker     Packs/day: 0.25     Years: 43.00     Types: Cigarettes    Smokeless tobacco: Never Used      Comment: joined [employer]  Alcohol use No        Lab Results   Component Value Date/Time WBC 8.3 04/20/2018 10:42 AM    HGB 12.9 04/20/2018 10:42 AM    Hemoglobin (POC) 13.9 05/13/2011 11:36 AM    HCT 39.0 04/20/2018 10:42 AM    Hematocrit (POC) 41 05/13/2011 11:36 AM    PLATELET 738 24/47/8456 10:42 AM    MCV 88 04/20/2018 10:42 AM     Lab Results   Component Value Date/Time    Hemoglobin A1c 5.8 02/04/2015 03:25 AM    Glucose 84 04/20/2018 10:42 AM    Glucose (POC) 94 05/13/2011 11:36 AM    LDL, calculated 98 04/20/2018 10:42 AM    Creatinine (POC) 0.6 05/13/2011 11:36 AM    Creatinine 0.83 04/20/2018 10:42 AM      Lab Results   Component Value Date/Time    Cholesterol, total 155 04/20/2018 10:42 AM    HDL Cholesterol 43 04/20/2018 10:42 AM    LDL, calculated 98 04/20/2018 10:42 AM    Triglyceride 72 04/20/2018 10:42 AM    CHOL/HDL Ratio 3.8 02/04/2015 03:25 AM     Lab Results   Component Value Date/Time    ALT (SGPT) 9 04/20/2018 10:42 AM    AST (SGOT) 18 04/20/2018 10:42 AM    Alk.  phosphatase 145 (H) 04/20/2018 10:42 AM    Bilirubin, direct <0.1 07/21/2016 11:27 PM    Bilirubin, total 0.3 04/20/2018 10:42 AM    Albumin 4.0 04/20/2018 10:42 AM    Protein, total 6.4 04/20/2018 10:42 AM    INR 1.1 02/04/2015 03:25 AM    Prothrombin time 10.6 02/04/2015 03:25 AM    PLATELET 037 25/50/4468 10:42 AM       Lab Results   Component Value Date/Time    GFR est non-AA 77 04/20/2018 10:42 AM    GFRNA, POC >60 05/13/2011 11:36 AM    GFR est AA 89 04/20/2018 10:42 AM    GFRAA, POC >60 05/13/2011 11:36 AM    Creatinine 0.83 04/20/2018 10:42 AM    Creatinine (POC) 0.6 05/13/2011 11:36 AM    BUN 14 04/20/2018 10:42 AM    BUN (POC) 8 (L) 05/13/2011 11:36 AM    Sodium 140 04/20/2018 10:42 AM    Sodium (POC) 140 05/13/2011 11:36 AM    Potassium 4.4 04/20/2018 10:42 AM    Potassium (POC) 4.3 05/13/2011 11:36 AM    Chloride 98 04/20/2018 10:42 AM    Chloride (POC) 105 05/13/2011 11:36 AM    CO2 25 04/20/2018 10:42 AM     Lab Results   Component Value Date/Time    TSH 3.030 09/16/2017 10:04 AM    TSH 3.370 05/20/2013 08:36 AM    T3 Uptake 21 (L) 09/16/2011 12:00 AM    T4, Total 16.6 (H) 09/16/2011 12:00 AM      Lab Results   Component Value Date/Time    Glucose 84 04/20/2018 10:42 AM    Glucose (POC) 94 05/13/2011 11:36 AM         Specific concerns today: some bilateral ear pain off and on. Her anxiety is well controlled and she is tapering off meds with the help of her psych MD. Some bilateral shoulder and hip pian off and on. .    Review of Systems  A comprehensive review of systems was negative except for that written in the HPI. Objective:   Blood pressure 117/75, pulse 90, temperature 98.6 °F (37 °C), temperature source Oral, resp. rate 18, height 5' 1\" (1.549 m), weight 155 lb (70.3 kg), SpO2 99 %. Physical Examination:   General appearance - alert, well appearing, and in no distress  Eyes - pupils equal and reactive, extraocular eye movements intact  Ears - bilateral TM's and external ear canals normal  Nose - normal and patent, no erythema, discharge or polyps  Mouth - mucous membranes moist, pharynx normal without lesions  Neck - supple, no significant adenopathy  Lymphatics - no palpable lymphadenopathy, no hepatosplenomegaly  Chest - clear to auscultation, no wheezes, rales or rhonchi, symmetric air entry  Heart - normal rate, regular rhythm, normal S1, S2, no murmurs, rubs, clicks or gallops  Abdomen - soft, nontender, nondistended, no masses or organomegaly  Back exam - full range of motion, no tenderness, palpable spasm or pain on motion  Neurological - alert, oriented, normal speech, no focal findings or movement disorder noted  Musculoskeletal - abnormal exam of both shoulders with mild tenderness over the apex as well as mild crepitus. Mild tenderness over the hip bursa bilaterally. Normal ROM.    Extremities - peripheral pulses normal, no pedal edema, no clubbing or cyanosis     Assessment/Plan:     lose weight, increase physical activity, stop smoking (advice and handout given), bring BP log to office visit, routine labs ordered  Diagnoses and all orders for this visit:    1. Routine general medical examination at a health care facility  -     METABOLIC PANEL, COMPREHENSIVE  -     LIPID PANEL  -     CBC WITH AUTOMATED DIFF  -     TSH RFX ON ABNORMAL TO FREE T4    2. Meningioma ([facility] - resolved post surgery    3. Moderate major depression (HCC) - stable    4. Obstruction of right carotid artery - recent visit with vascular and stable    5. Mixed hyperlipidemia - LDL goal of 100. Check labs and be sure met. 6. Chronic hepatitis C without hepatic coma ([facility] - resolved with treatment. 7. Extra-axial brain tumor ([facility]    8. Cerebrovascular accident (CVA), unspecified mechanism ([facility] - stable on aspirin    9. Encounter for immunization  -     Influenza virus vaccine (QUADRIVALENT PRES FREE SYRINGE) IM (73402)  10. Hip and shoulder bursitis - will treat with tylenol and stretches and let me know if no better, Consider mobic and PT.   11. Ear pain - likely TMJ with tenderness on exam today. Tylenol and as needed. Other orders  -     varicella-zoster recombinant, PF, (SHINGRIX) 50 mcg/0.5 mL susr injection; 0.5 mL by IntraMUSCular route once for 1 dose. Follow-up Disposition: 12 months and as needed. Advised her to call back or return to office if symptoms worsen/change/persist.  Discussed expected course/resolution/complications of diagnosis in detail with patient. Medication risks/benefits/costs/interactions/alternatives discussed with patient. She was given an after visit summary which includes diagnoses, current medications, & vitals. She expressed understanding with the diagnosis and plan.

## 2018-10-18 LAB
ALBUMIN SERPL-MCNC: 3.9 G/DL (ref 3.6–4.8)
ALBUMIN/GLOB SERPL: 1.6 {RATIO} (ref 1.2–2.2)
ALP SERPL-CCNC: 133 IU/L (ref 39–117)
ALT SERPL-CCNC: 11 IU/L (ref 0–32)
AST SERPL-CCNC: 15 IU/L (ref 0–40)
BASOPHILS # BLD AUTO: 0 X10E3/UL (ref 0–0.2)
BASOPHILS NFR BLD AUTO: 1 %
BILIRUB SERPL-MCNC: 0.3 MG/DL (ref 0–1.2)
BUN SERPL-MCNC: 15 MG/DL (ref 8–27)
BUN/CREAT SERPL: 17 (ref 12–28)
CALCIUM SERPL-MCNC: 9 MG/DL (ref 8.7–10.3)
CHLORIDE SERPL-SCNC: 104 MMOL/L (ref 96–106)
CHOLEST SERPL-MCNC: 148 MG/DL (ref 100–199)
CO2 SERPL-SCNC: 24 MMOL/L (ref 20–29)
CREAT SERPL-MCNC: 0.86 MG/DL (ref 0.57–1)
EOSINOPHIL # BLD AUTO: 0.2 X10E3/UL (ref 0–0.4)
EOSINOPHIL NFR BLD AUTO: 2 %
ERYTHROCYTE [DISTWIDTH] IN BLOOD BY AUTOMATED COUNT: 14.3 % (ref 12.3–15.4)
GLOBULIN SER CALC-MCNC: 2.5 G/DL (ref 1.5–4.5)
GLUCOSE SERPL-MCNC: 107 MG/DL (ref 65–99)
HCT VFR BLD AUTO: 38.2 % (ref 34–46.6)
HDLC SERPL-MCNC: 39 MG/DL
HGB BLD-MCNC: 12.6 G/DL (ref 11.1–15.9)
IMM GRANULOCYTES # BLD: 0 X10E3/UL (ref 0–0.1)
IMM GRANULOCYTES NFR BLD: 0 %
LDLC SERPL CALC-MCNC: 91 MG/DL (ref 0–99)
LYMPHOCYTES # BLD AUTO: 2.3 X10E3/UL (ref 0.7–3.1)
LYMPHOCYTES NFR BLD AUTO: 27 %
MCH RBC QN AUTO: 29 PG (ref 26.6–33)
MCHC RBC AUTO-ENTMCNC: 33 G/DL (ref 31.5–35.7)
MCV RBC AUTO: 88 FL (ref 79–97)
MONOCYTES # BLD AUTO: 0.8 X10E3/UL (ref 0.1–0.9)
MONOCYTES NFR BLD AUTO: 9 %
NEUTROPHILS # BLD AUTO: 5.4 X10E3/UL (ref 1.4–7)
NEUTROPHILS NFR BLD AUTO: 61 %
PLATELET # BLD AUTO: 212 X10E3/UL (ref 150–379)
POTASSIUM SERPL-SCNC: 4.1 MMOL/L (ref 3.5–5.2)
PROT SERPL-MCNC: 6.4 G/DL (ref 6–8.5)
RBC # BLD AUTO: 4.35 X10E6/UL (ref 3.77–5.28)
SODIUM SERPL-SCNC: 141 MMOL/L (ref 134–144)
T4 FREE SERPL-MCNC: 1.28 NG/DL (ref 0.82–1.77)
TRIGL SERPL-MCNC: 89 MG/DL (ref 0–149)
TSH SERPL DL<=0.005 MIU/L-ACNC: 4.81 UIU/ML (ref 0.45–4.5)
VLDLC SERPL CALC-MCNC: 18 MG/DL (ref 5–40)
WBC # BLD AUTO: 8.7 X10E3/UL (ref 3.4–10.8)

## 2018-12-10 ENCOUNTER — HOSPITAL ENCOUNTER (OUTPATIENT)
Dept: MRI IMAGING | Age: 63
Discharge: HOME | End: 2018-12-10
Attending: NEUROLOGICAL SURGERY
Payer: COMMERCIAL

## 2018-12-10 VITALS — BODY MASS INDEX: 28.34 KG/M2 | WEIGHT: 150 LBS

## 2018-12-10 DIAGNOSIS — D32.9 BENIGN NEOPLASM OF MENINGES (HCC): ICD-10-CM

## 2018-12-10 PROCEDURE — 74011250636 HC RX REV CODE- 250/636: Performed by: NEUROLOGICAL SURGERY

## 2018-12-10 PROCEDURE — 70553 MRI BRAIN STEM W/O & W/DYE: CPT

## 2018-12-10 PROCEDURE — A9575 INJ GADOTERATE MEGLUMI 0.1ML: HCPCS | Performed by: NEUROLOGICAL SURGERY

## 2018-12-10 RX ORDER — GADOTERATE MEGLUMINE 376.9 MG/ML
13 INJECTION INTRAVENOUS
Status: COMPLETED | OUTPATIENT
Start: 2018-12-10 | End: 2018-12-10

## 2018-12-10 RX ADMIN — GADOTERATE MEGLUMINE 13 ML: 376.9 INJECTION INTRAVENOUS at 13:16

## 2019-02-18 RX ORDER — ESOMEPRAZOLE MAGNESIUM 40 MG/1
40 CAPSULE, DELAYED RELEASE ORAL DAILY
Qty: 90 CAP | Refills: 3 | Status: SHIPPED | COMMUNITY
Start: 2019-02-18 | End: 2019-12-27 | Stop reason: SDUPTHER

## 2019-02-18 NOTE — TELEPHONE ENCOUNTER
Orders Placed This Encounter    esomeprazole (NEXIUM) 40 mg capsule     Sig: Take 1 Cap by mouth daily. Dispense:  90 Cap     Refill:  3     The above orders were approved via [facility] per Dr. Esteban Hayden, III.

## 2019-08-22 ENCOUNTER — HOSPITAL ENCOUNTER (OUTPATIENT)
Dept: MRI IMAGING | Age: 64
Discharge: HOME | End: 2019-08-22
Attending: NEUROLOGICAL SURGERY
Payer: COMMERCIAL

## 2019-08-22 VITALS — BODY MASS INDEX: 27.96 KG/M2 | WEIGHT: 148 LBS

## 2019-08-22 DIAGNOSIS — D32.9 MENINGIOMA (HCC): ICD-10-CM

## 2019-08-22 PROCEDURE — 74011250636 HC RX REV CODE- 250/636: Performed by: NEUROLOGICAL SURGERY

## 2019-08-22 PROCEDURE — 70553 MRI BRAIN STEM W/O & W/DYE: CPT

## 2019-08-22 PROCEDURE — A9575 INJ GADOTERATE MEGLUMI 0.1ML: HCPCS | Performed by: NEUROLOGICAL SURGERY

## 2019-08-22 RX ORDER — GADOTERATE MEGLUMINE 376.9 MG/ML
13 INJECTION INTRAVENOUS
Status: COMPLETED | OUTPATIENT
Start: 2019-08-22 | End: 2019-08-22

## 2019-08-22 RX ADMIN — GADOTERATE MEGLUMINE 13 ML: 376.9 INJECTION INTRAVENOUS at 10:29

## 2019-10-04 ENCOUNTER — HOSPITAL ENCOUNTER (OUTPATIENT)
Dept: MAMMOGRAPHY | Age: 64
Discharge: HOME | End: 2019-10-04
Attending: OBSTETRICS & GYNECOLOGY
Payer: COMMERCIAL

## 2019-10-04 DIAGNOSIS — Z12.39 BREAST SCREENING: ICD-10-CM

## 2019-10-04 PROCEDURE — 77067 SCR MAMMO BI INCL CAD: CPT

## 2019-10-11 ENCOUNTER — OFFICE VISIT (OUTPATIENT)
Dept: INTERNAL MEDICINE CLINIC | Age: 64
End: 2019-10-11

## 2019-10-11 VITALS
TEMPERATURE: 98.4 F | RESPIRATION RATE: 14 BRPM | HEART RATE: 82 BPM | HEIGHT: 61 IN | DIASTOLIC BLOOD PRESSURE: 70 MMHG | BODY MASS INDEX: 28.89 KG/M2 | WEIGHT: 153 LBS | SYSTOLIC BLOOD PRESSURE: 106 MMHG | OXYGEN SATURATION: 95 %

## 2019-10-11 DIAGNOSIS — Z00.00 WELL WOMAN EXAM (NO GYNECOLOGICAL EXAM): Primary | ICD-10-CM

## 2019-10-11 DIAGNOSIS — I63.9 CEREBROVASCULAR ACCIDENT (CVA), UNSPECIFIED MECHANISM (HCC): ICD-10-CM

## 2019-10-11 DIAGNOSIS — Z23 ENCOUNTER FOR IMMUNIZATION: ICD-10-CM

## 2019-10-11 DIAGNOSIS — E78.2 MIXED HYPERLIPIDEMIA: ICD-10-CM

## 2019-10-11 DIAGNOSIS — D32.9 MENINGIOMA (HCC): ICD-10-CM

## 2019-10-11 DIAGNOSIS — F32.1 MODERATE MAJOR DEPRESSION (HCC): ICD-10-CM

## 2019-10-11 RX ORDER — ATORVASTATIN CALCIUM 10 MG/1
TABLET, FILM COATED ORAL
Qty: 90 TAB | Refills: 3 | Status: SHIPPED | OUTPATIENT
Start: 2019-10-11 | End: 2020-07-19 | Stop reason: SDUPTHER

## 2019-10-11 NOTE — PATIENT INSTRUCTIONS
Vaccine Information Statement    Influenza (Flu) Vaccine (Inactivated or Recombinant): What You Need to Know    Many Vaccine Information Statements are available in Thai and other languages. See www.immunize.org/vis  Hojas de información sobre vacunas están disponibles en español y en muchos otros idiomas. Visite www.immunize.org/vis    1. Why get vaccinated? Influenza vaccine can prevent influenza (flu). Flu is a contagious disease that spreads around the [place] [facility] every year, usually between October and May. Anyone can get the flu, but it is more dangerous for some people. Infants and young children, people 72years of age and older, pregnant women, and people with certain health conditions or a weakened immune system are at greatest risk of flu complications. Pneumonia, bronchitis, sinus infections and ear infections are examples of flu-related complications. If you have a medical condition, such as heart disease, cancer or diabetes, flu can make it worse. Flu can cause fever and chills, sore throat, muscle aches, fatigue, cough, headache, and runny or stuffy nose. Some people may have vomiting and diarrhea, though this is more common in children than adults. Each year thousands of people in the [facility] die from flu, and many more are hospitalized. Flu vaccine prevents millions of illnesses and flu-related visits to the doctor each year. 2. Influenza vaccines     CDC recommends everyone 10months of age and older get vaccinated every flu season. Children 6 months through 6years of age may need 2 doses during a single flu season. Everyone else needs only 1 dose each flu season. It takes about 2 weeks for protection to develop after vaccination. There are many flu viruses, and they are always changing. Each year a new flu vaccine is made to protect against three or four viruses that are likely to cause disease in the upcoming flu season.  Even when the vaccine doesnt exactly match these viruses, it may still provide some protection. Influenza vaccine does not cause flu. Influenza vaccine may be given at the same time as other vaccines. 3. Talk with your health care provider    Tell your vaccine provider if the person getting the vaccine:   Has had an allergic reaction after a previous dose of influenza vaccine, or has any severe, life-threatening allergies.  Has ever had Guillain-Barré Syndrome (also called GBS). In some cases, your health care provider may decide to postpone influenza vaccination to a future visit. People with minor illnesses, such as a cold, may be vaccinated. People who are moderately or severely ill should usually wait until they recover before getting influenza vaccine. Your health care provider can give you more information. 4. Risks of a reaction     Soreness, redness, and swelling where shot is given, fever, muscle aches, and headache can happen after influenza vaccine.  There may be a very small increased risk of Guillain-Barré Syndrome (GBS) after inactivated influenza vaccine (the flu shot). [facility] children who get the flu shot along with pneumococcal vaccine (PCV13), and/or DTaP vaccine at the same time might be slightly more likely to have a seizure caused by fever. Tell your health care provider if a child who is getting flu vaccine has ever had a seizure. People sometimes faint after medical procedures, including vaccination. Tell your provider if you feel dizzy or have vision changes or ringing in the ears. As with any medicine, there is a very remote chance of a vaccine causing a severe allergic reaction, other serious injury, or death. 5. What if there is a serious problem? An allergic reaction could occur after the vaccinated person leaves the clinic.  If you see signs of a severe allergic reaction (hives, swelling of the face and throat, difficulty breathing, a fast heartbeat, dizziness, or weakness), call 9-1-1 and get the person to the nearest hospital.    For other signs that concern you, call your health care provider. Adverse reactions should be reported to the Vaccine Adverse Event Reporting System (VAERS). Your health care provider will usually file this report, or you can do it yourself. Visit the VAERS website at www.vaers. [facility].gov or call 9-523.667.3837. VAERS is only for reporting reactions, and VAERS staff do not give medical advice. 6. The National Vaccine Injury Compensation Program    The [facility] Vaccine Injury Compensation Program (VICP) is a federal program that was created to compensate people who may have been injured by certain vaccines. Visit the VICP website at www.hrsa.gov/vaccinecompensation or call 1-535.316.1992 to learn about the program and about filing a claim. There is a time limit to file a claim for compensation. 7. How can I learn more?  Ask your health care provider.  Call your local or state health department.  Contact the Centers for Disease Control and Prevention (CDC):  - Call 9-698.526.9031 (1-800-CDC-INFO) or  - Visit CDCs influenza website at www.cdc.gov/flu    Vaccine Information Statement (Interim)  Inactivated Influenza Vaccine   8/15/2019  [address]ira Riggins [id number]   Department of Health and Human Services  Centers for Disease Control and Prevention    Office Use Only

## 2019-10-11 NOTE — PROGRESS NOTES
Subjective:   60 y.o. female for Well Woman Check. Her gyne and breast care is done elsewhere by her Ob-Gyne physician. 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 90 Cap 3    citalopram (CELEXA) 10 mg tablet       dextroamphetamine-amphetamine (ADDERALL) 30 mg tablet Take 15 mg by mouth two (2) times a day.  0    aspirin delayed-release 81 mg tablet Take 81 mg by mouth nightly.  cholecalciferol, vitamin D3, (VITAMIN D3) 2,000 unit Tab Take 2,000 Units by mouth daily.  clonazePAM (KLONOPIN) 0.5 mg tablet Take 0.5 mg by mouth nightly. Allergies   Allergen Reactions    Tetracycline Other (comments)     GI upset     Past Medical History:   Diagnosis Date    Anxiety     CAD (coronary artery disease)     Colon polyps     Depression     GERD (gastroesophageal reflux disease)     Hemorrhoids     Hepatitis C     genotype 1    History of kidney stones     Hx of amaurosis fugax     right eye, 2014    Liver disease 2011    hep c, [place] 2011    Meningioma [facility]) 2014    SURGERY TO BE SCHEDULED    Migraines     Osteoporosis 2008    Psychiatric disorder     ANXIETY AND DEPRESSION    PVD (peripheral vascular disease) ([facility] 2/2014    W/STENT , DR Kelley Fitch    Seizures ([facility] 2013    ?  SEIZURE, CONFUSION, TROUBLE WITH SPEECH     Past Surgical History:   Procedure Laterality Date    HX CHOLECYSTECTOMY  2001  HX COLONOSCOPY  10/27/2015    HX CRANIOTOMY  6/25/14    meningioma r medial sphenoid wing    HX GYN  1984    Tubal pregnancy    HX HYSTERECTOMY      HX OTHER SURGICAL  2/27/2014    Dr. Tg Engle      [address]  2/2014    ABD AORTIC STENT    VASCULAR SURGERY PROCEDURE UNLIST Right 6/10/14    CAROTID      Family History   Problem Relation Age of Onset    Heart Disease Mother         Heart attack and triple bypass    Anxiety Mother     Cancer Mother         Lung    Heart Surgery Mother         CABG X3 VESSEL    Alcohol abuse Mother         Quit Drinking 1986    Hypertension Mother         on medication    Lung Disease Mother         COPD    Cancer Sister         vaginal    Other Sister         fibromyalgia    Diabetes Sister     Anxiety Sister     Alcohol abuse Father         Never stoped    Asthma Father         Had since a child    Cancer Sister         Vaginal    Asthma Sister     Heart Disease Sister         mild heart attach 2018    Hypertension Sister         on medication    Lung Disease Sister         COPD    Diabetes Sister     Heart Disease Maternal Grandmother         heart aneurysm 46 of age 56    Heart Disease Paternal Grandmother         several heart attacks    Hypertension Paternal Grandmother         on medication    Stroke Paternal Grandmother         Multiple strokes in her 66's    Heart Disease Paternal Grandfather         major heart attack cause of death    Lung Disease Maternal Grandfather         COPD    Anesth Problems Neg Hx      Social History     Tobacco Use    Smoking status: Current Every Day Smoker     Packs/day: 0.25     Years: 43.00     Pack years: 10.75     Types: Cigarettes    Smokeless tobacco: Never Used    Tobacco comment: joined [id number] Quit for Life   Substance Use Topics    Alcohol use: No     Alcohol/week: 0.0 standard drinks        Lab Results   Component Value Date/Time    WBC 8.7 10/17/2018 10:08 AM    HGB 12.6 10/17/2018 10:08 AM    Hemoglobin (POC) 13.9 05/13/2011 11:36 AM    HCT 38.2 10/17/2018 10:08 AM    Hematocrit (POC) 41 05/13/2011 11:36 AM    PLATELET 095 85/90/1201 10:08 AM    MCV 88 10/17/2018 10:08 AM     Lab Results   Component Value Date/Time    Cholesterol, total 148 10/17/2018 10:08 AM    HDL Cholesterol 39 (L) 10/17/2018 10:08 AM    LDL, calculated 91 10/17/2018 10:08 AM    Triglyceride 89 10/17/2018 10:08 AM    CHOL/HDL Ratio 3.8 02/04/2015 03:25 AM     Lab Results   Component Value Date/Time    ALT (SGPT) 11 10/17/2018 10:08 AM    AST (SGOT) 15 10/17/2018 10:08 AM    Alk. phosphatase 133 (H) 10/17/2018 10:08 AM    Bilirubin, direct <0.1 07/21/2016 11:27 PM    Bilirubin, total 0.3 10/17/2018 10:08 AM    Albumin 3.9 10/17/2018 10:08 AM    Protein, total 6.4 10/17/2018 10:08 AM    INR 1.1 02/04/2015 03:25 AM    Prothrombin time 10.6 02/04/2015 03:25 AM    PLATELET 919 59/71/5734 10:08 AM     Lab Results   Component Value Date/Time    GFR est non-AA 73 10/17/2018 10:08 AM    GFRNA, POC >60 05/13/2011 11:36 AM    GFR est AA 84 10/17/2018 10:08 AM    GFRAA, POC >60 05/13/2011 11:36 AM    Creatinine 0.86 10/17/2018 10:08 AM    Creatinine (POC) 0.6 05/13/2011 11:36 AM    BUN 15 10/17/2018 10:08 AM    BUN (POC) 8 (L) 05/13/2011 11:36 AM    Sodium 141 10/17/2018 10:08 AM    Sodium (POC) 140 05/13/2011 11:36 AM    Potassium 4.1 10/17/2018 10:08 AM    Potassium (POC) 4.3 05/13/2011 11:36 AM    Chloride 104 10/17/2018 10:08 AM    Chloride (POC) 105 05/13/2011 11:36 AM    CO2 24 10/17/2018 10:08 AM     Lab Results   Component Value Date/Time    TSH 4.810 (H) 10/17/2018 10:08 AM    TSH 3.370 05/20/2013 08:36 AM    T3 Uptake 21 (L) 09/16/2011 12:00 AM    T4, Total 16.6 (H) 09/16/2011 12:00 AM      Lab Results   Component Value Date/Time    Glucose 107 (H) 10/17/2018 10:08 AM    Glucose (POC) 94 05/13/2011 11:36 AM         Specific concerns today: Some recent migraine headaches.   She has seen the neurosurgeon and had a follow-up MRI that was unchanged. She is seeing the neurologist for follow-up of that. Has had some recent hip pain and back pain that is been treated with physical therapy and improved. She has a skin lesion on the left leg she wanted me to check. .    Review of Systems  A comprehensive review of systems was negative except for that written in the HPI. Objective:   Blood pressure 106/70, pulse 82, temperature 98.4 °F (36.9 °C), temperature source Oral, resp. rate 14, height 5' 1\" (1.549 m), weight 153 lb (69.4 kg), SpO2 95 %. Physical Examination:   General appearance - alert, well appearing, and in no distress  Eyes - pupils equal and reactive, extraocular eye movements intact  Ears - bilateral TM's and external ear canals normal  Nose - normal and patent, no erythema, discharge or polyps  Mouth - mucous membranes moist, pharynx normal without lesions  Neck - supple, no significant adenopathy  Lymphatics - no palpable lymphadenopathy, no hepatosplenomegaly  Chest - clear to auscultation, no wheezes, rales or rhonchi, symmetric air entry  Heart - normal rate, regular rhythm, normal S1, S2, no murmurs, rubs, clicks or gallops  Abdomen - soft, nontender, nondistended, no masses or organomegaly  Back exam - full range of motion, no tenderness, palpable spasm or pain on motion  Neurological - alert, oriented, normal speech, no focal findings or movement disorder noted  Musculoskeletal - no joint tenderness, deformity or swelling  Extremities - peripheral pulses normal, no pedal edema, no clubbing or cyanosis  Skin -2 small raised slightly pigmented lesions on the left thigh. Assessment/Plan:     lose weight, increase physical activity, stop smoking (advice and handout given), follow low fat diet, follow low salt diet, routine labs ordered  Diagnoses and all orders for this visit:    1.  Well woman exam (no gynecological exam)  Comments:  [V70.0]  Orders:  -     METABOLIC PANEL, COMPREHENSIVE  -     CBC WITH AUTOMATED DIFF  -     LIPID PANEL  -     TSH RFX ON ABNORMAL TO FREE T4  -     VITAMIN D, 25 HYDROXY  -     URINALYSIS W/MICROSCOPIC    2. Encounter for immunization  -     INFLUENZA VIRUS VAC QUAD,SPLIT,PRESV FREE SYRINGE IM  -     IN IMMUNIZ ADMIN,1 SINGLE/COMB VAC/TOXOID    3. [facility] ([facility] -stable after surgery. 4. Moderate major depression ([facility] -followed by psychiatry. 5. Mixed hyperlipidemia -LDL goal of 100. Check labs to be sure that it is. Tolerating statins. 6. Cerebrovascular accident (CVA), unspecified mechanism (HCC)-stable on statin and aspirin. 7.  Seborrheic keratoses-stable. Reassured her about these.     Other orders  -     atorvastatin (LIPITOR) 10 mg tablet; TAKE 1 TABLET NIGHTLY

## 2019-12-13 LAB
25(OH)D3+25(OH)D2 SERPL-MCNC: 37.4 NG/ML (ref 30–100)
ALBUMIN SERPL-MCNC: 4 G/DL (ref 3.6–4.8)
ALBUMIN/GLOB SERPL: 1.4 {RATIO} (ref 1.2–2.2)
ALP SERPL-CCNC: 141 IU/L (ref 39–117)
ALT SERPL-CCNC: 9 IU/L (ref 0–32)
APPEARANCE UR: ABNORMAL
AST SERPL-CCNC: 16 IU/L (ref 0–40)
BACTERIA #/AREA URNS HPF: ABNORMAL /[HPF]
BASOPHILS # BLD AUTO: 0.1 X10E3/UL (ref 0–0.2)
BASOPHILS NFR BLD AUTO: 1 %
BILIRUB SERPL-MCNC: 0.3 MG/DL (ref 0–1.2)
BILIRUB UR QL STRIP: NEGATIVE
BUN SERPL-MCNC: 15 MG/DL (ref 8–27)
BUN/CREAT SERPL: 16 (ref 12–28)
CALCIUM SERPL-MCNC: 9.4 MG/DL (ref 8.7–10.3)
CASTS URNS MICRO: ABNORMAL
CASTS URNS QL MICRO: PRESENT /LPF
CHLORIDE SERPL-SCNC: 99 MMOL/L (ref 96–106)
CHOLEST SERPL-MCNC: 164 MG/DL (ref 100–199)
CO2 SERPL-SCNC: 25 MMOL/L (ref 20–29)
COLOR UR: YELLOW
CREAT SERPL-MCNC: 0.95 MG/DL (ref 0.57–1)
EOSINOPHIL # BLD AUTO: 0.1 X10E3/UL (ref 0–0.4)
EOSINOPHIL NFR BLD AUTO: 1 %
EPI CELLS #/AREA URNS HPF: ABNORMAL /HPF (ref 0–10)
ERYTHROCYTE [DISTWIDTH] IN BLOOD BY AUTOMATED COUNT: 13.2 % (ref 12.3–15.4)
GLOBULIN SER CALC-MCNC: 2.8 G/DL (ref 1.5–4.5)
GLUCOSE SERPL-MCNC: 98 MG/DL (ref 65–99)
GLUCOSE UR QL: NEGATIVE
HCT VFR BLD AUTO: 41.3 % (ref 34–46.6)
HDLC SERPL-MCNC: 45 MG/DL
HGB BLD-MCNC: 13.4 G/DL (ref 11.1–15.9)
HGB UR QL STRIP: NEGATIVE
IMM GRANULOCYTES # BLD AUTO: 0 X10E3/UL (ref 0–0.1)
IMM GRANULOCYTES NFR BLD AUTO: 0 %
KETONES UR QL STRIP: NEGATIVE
LDLC SERPL CALC-MCNC: 100 MG/DL (ref 0–99)
LEUKOCYTE ESTERASE UR QL STRIP: NEGATIVE
LYMPHOCYTES # BLD AUTO: 2.5 X10E3/UL (ref 0.7–3.1)
LYMPHOCYTES NFR BLD AUTO: 26 %
MCH RBC QN AUTO: 29.3 PG (ref 26.6–33)
MCHC RBC AUTO-ENTMCNC: 32.4 G/DL (ref 31.5–35.7)
MCV RBC AUTO: 90 FL (ref 79–97)
MICRO URNS: ABNORMAL
MICRO URNS: ABNORMAL
MONOCYTES # BLD AUTO: 0.6 X10E3/UL (ref 0.1–0.9)
MONOCYTES NFR BLD AUTO: 6 %
MUCOUS THREADS URNS QL MICRO: PRESENT
NEUTROPHILS # BLD AUTO: 6.3 X10E3/UL (ref 1.4–7)
NEUTROPHILS NFR BLD AUTO: 66 %
NITRITE UR QL STRIP: NEGATIVE
PH UR STRIP: 5 [PH] (ref 5–7.5)
PLATELET # BLD AUTO: 259 X10E3/UL (ref 150–450)
POTASSIUM SERPL-SCNC: 4.8 MMOL/L (ref 3.5–5.2)
PROT SERPL-MCNC: 6.8 G/DL (ref 6–8.5)
PROT UR QL STRIP: NEGATIVE
RBC # BLD AUTO: 4.58 X10E6/UL (ref 3.77–5.28)
RBC #/AREA URNS HPF: ABNORMAL /HPF (ref 0–2)
SODIUM SERPL-SCNC: 139 MMOL/L (ref 134–144)
SP GR UR: 1.03 (ref 1–1.03)
TRIGL SERPL-MCNC: 96 MG/DL (ref 0–149)
TSH SERPL DL<=0.005 MIU/L-ACNC: 2.21 UIU/ML (ref 0.45–4.5)
UROBILINOGEN UR STRIP-MCNC: 0.2 MG/DL (ref 0.2–1)
VLDLC SERPL CALC-MCNC: 19 MG/DL (ref 5–40)
WBC # BLD AUTO: 9.6 X10E3/UL (ref 3.4–10.8)
WBC #/AREA URNS HPF: ABNORMAL /HPF (ref 0–5)

## 2019-12-16 ENCOUNTER — OFFICE VISIT (OUTPATIENT)
Dept: NEUROLOGY | Age: 64
End: 2019-12-16

## 2019-12-16 VITALS
RESPIRATION RATE: 16 BRPM | HEIGHT: 61 IN | SYSTOLIC BLOOD PRESSURE: 118 MMHG | HEART RATE: 79 BPM | WEIGHT: 156 LBS | OXYGEN SATURATION: 97 % | BODY MASS INDEX: 29.45 KG/M2 | DIASTOLIC BLOOD PRESSURE: 80 MMHG

## 2019-12-16 DIAGNOSIS — G43.109 MIGRAINE WITH AURA AND WITHOUT STATUS MIGRAINOSUS, NOT INTRACTABLE: Primary | ICD-10-CM

## 2019-12-16 DIAGNOSIS — D32.9 MENINGIOMA (HCC): ICD-10-CM

## 2019-12-16 DIAGNOSIS — Z86.73 HISTORY OF ISCHEMIC RIGHT ICA STROKE: ICD-10-CM

## 2019-12-16 RX ORDER — ONDANSETRON 4 MG/1
4 TABLET, ORALLY DISINTEGRATING ORAL
Qty: 30 TAB | Refills: 1 | Status: SHIPPED | OUTPATIENT
Start: 2019-12-16 | End: 2021-07-26 | Stop reason: ALTCHOICE

## 2019-12-16 NOTE — PROGRESS NOTES
New pt here regarding her headaches. She had migraines starting in 1999/2000 and they stopped. Returned this year in August. This time with pain and visual issues.

## 2019-12-16 NOTE — PROGRESS NOTES
Chief Complaint   Patient presents with    Headache       Referred by: Dr. Bruce Neville, Dr. Neely Chandrakant is a 43-year-old woman here to discuss vision changes and headache. I reviewed her record and discussed with her personally her history of left craniotomy for meningioma which was preceded by a right CEA that was symptomatic over 90% occluded with resultant right amaurosis. She underwent successful surgeries of the aforementioned. She is on aspirin and Lipitor daily. In August of this year she began to have kaleidoscopic vision changes and headache. She felt a little cross eyed. MRI brain was done which was stable compared to previous with residual meningioma but nothing new. No stroke. Since then she has had recurrent kaleidoscope aura with and without headache lasting 15 or 20 minutes 2 or 3 times a week that respond to Aleve. No numbness or weakness. She sees ophthalmology for history of diplopia following craniotomy. She sees vascular for peripheral arterial disease. Hx of treated Hep C. Review of Systems   Eyes: Negative for blurred vision and double vision. Kaleidoscope vision   Neurological: Positive for headaches. Negative for seizures and weakness. All other systems reviewed and are negative. Past Medical History:   Diagnosis Date    Anxiety     CAD (coronary artery disease)     Cerebral artery occlusion with cerebral infarction ([address]     Colon polyps     Depression     GERD (gastroesophageal reflux disease)     Hemorrhoids     Hepatitis C     genotype 1    History of kidney stones     Hx of amaurosis fugax     right eye, 2014    Liver disease 2011    hep c, [place] 2011    Meningioma [facility]) 2014    SURGERY TO BE SCHEDULED    Migraines     Osteoporosis 2008    Psychiatric disorder     ANXIETY AND DEPRESSION    PVD (peripheral vascular disease) ([facility] 2/2014    W/STENT , DR Fabio Rahman    Seizures ([facility] 2013    ?  SEIZURE, CONFUSION, TROUBLE WITH SPEECH Family History   Problem Relation Age of Onset    Heart Disease Mother         Heart attack and triple bypass    Anxiety Mother     Cancer Mother         Lung    Heart Surgery Mother         CABG X3 VESSEL    Alcohol abuse Mother         Quit Drinking 12    Hypertension Mother         on medication    Lung Disease Mother         COPD    Cancer Sister         vaginal    Other Sister         fibromyalgia    Diabetes Sister     Anxiety Sister     Alcohol abuse Father         Never stoped    Asthma Father         Had since a child   Lara Addy Cancer Sister         Vaginal    Asthma Sister     Heart Disease Sister         mild heart attach 2018    Hypertension Sister         on medication    Lung Disease Sister         COPD    Diabetes Sister     Heart Disease Maternal Grandmother         heart aneurysm 46 of age 56    Heart Disease Paternal Grandmother         several heart attacks    Hypertension Paternal Grandmother         on medication    Stroke Paternal Grandmother         Multiple strokes in her 66's    Heart Disease Paternal Grandfather         major heart attack cause of death    Lung Disease Maternal Grandfather         COPD    Anesth Problems Neg Hx      Social History     Socioeconomic History    Marital status:      Spouse name: Not on file    Number of children: Not on file    Years of education: Not on file    Highest education level: Not on file   Occupational History    Not on file   Social Needs    Financial resource strain: Not on file    Food insecurity:     Worry: Not on file     Inability: Not on file    Transportation needs:     Medical: Not on file     Non-medical: Not on file   Tobacco Use    Smoking status: Current Every Day Smoker     Packs/day: 0.25     Years: 43.00     Pack years: 10.75     Types: Cigarettes    Smokeless tobacco: Never Used    Tobacco comment: joined 2001 [facility] [email or web address] and Sexual Activity    Alcohol use:  No Alcohol/week: 0.0 standard drinks    Drug use: No    Sexual activity: Yes     Partners: Male     Birth control/protection: Surgical   Lifestyle    Physical activity:     Days per week: Not on file     Minutes per session: Not on file    Stress: Not on file   Relationships    Social connections:     Talks on phone: Not on file     Gets together: Not on file     Attends Mosque service: Not on file     Active member of club or organization: Not on file     Attends meetings of clubs or organizations: Not on file     Relationship status: Not on file    Intimate partner violence:     Fear of current or ex partner: Not on file     Emotionally abused: Not on file     Physically abused: Not on file     Forced sexual activity: Not on file   Other Topics Concern    Not on file   Social History Narrative    Not on file     Current Outpatient Medications   Medication Sig    ondansetron (ZOFRAN ODT) 4 mg disintegrating tablet Take 1 Tab by mouth every eight (8) hours as needed for Nausea (with headache).  atorvastatin (LIPITOR) 10 mg tablet TAKE 1 TABLET NIGHTLY    esomeprazole (NEXIUM) 40 mg capsule Take 1 Cap by mouth daily.  citalopram (CELEXA) 10 mg tablet     dextroamphetamine-amphetamine (ADDERALL) 30 mg tablet Take 15 mg by mouth two (2) times a day.  aspirin delayed-release 81 mg tablet Take 81 mg by mouth nightly.  cholecalciferol, vitamin D3, (VITAMIN D3) 2,000 unit Tab Take 2,000 Units by mouth daily.  clonazePAM (KLONOPIN) 0.5 mg tablet Take 0.5 mg by mouth nightly. No current facility-administered medications for this visit. Allergies   Allergen Reactions    Tetracycline Other (comments)     GI upset         Neurologic Exam     Mental Status   Oriented to person, place, and time. Cranial Nerves   Cranial nerves II through XII intact.      Motor Exam   Muscle bulk: normalMoving all extremities spontaneously symmetrically     Sensory Exam   Light touch normal.     Gait, Coordination, and Reflexes     Gait  Gait: normal    Tremor   Resting tremor: absent    Physical Exam   Constitutional: She is oriented to person, place, and time. She appears well-developed and well-nourished. Cardiovascular: Normal rate. Pulmonary/Chest: Effort normal.   Neurological: She is oriented to person, place, and time. Gait normal.   Skin: Skin is warm and dry. Psychiatric: Her behavior is normal.   Vitals reviewed. Visit Vitals  /80   Pulse 79   Resp 16   Ht 5' 1\" (1.549 m)   Wt 70.8 kg (156 lb)   SpO2 97%   BMI 29.48 kg/m²       Lab Results   Component Value Date/Time    WBC 9.6 12/12/2019 12:23 PM    HGB 13.4 12/12/2019 12:23 PM    Hemoglobin (POC) 13.9 05/13/2011 11:36 AM    HCT 41.3 12/12/2019 12:23 PM    Hematocrit (POC) 41 05/13/2011 11:36 AM    PLATELET 963 65/36/7617 12:23 PM    MCV 90 12/12/2019 12:23 PM     Lab Results   Component Value Date/Time    ALT (SGPT) 9 12/12/2019 12:23 PM    AST (SGOT) 16 12/12/2019 12:23 PM    Alk. phosphatase 141 (H) 12/12/2019 12:23 PM    Bilirubin, direct <0.1 07/21/2016 11:27 PM    Bilirubin, total 0.3 12/12/2019 12:23 PM    Albumin 4.0 12/12/2019 12:23 PM    Protein, total 6.8 12/12/2019 12:23 PM    INR 1.1 02/04/2015 03:25 AM    Prothrombin time 10.6 02/04/2015 03:25 AM    PLATELET 669 34/25/7163 12:23 PM        CT Results (maximum last 3): Results from Orders Only encounter on 10/25/16   CT ABD PELV WO CONT   Results from [place] encounter on 02/03/15   CT HEAD WO CONT    Narrative **Final Report**       ICD Codes / Adm. Diagnosis: 52  689012 / Headache  Dizziness  Examination:  CT HEAD WO CON  - [id number] - Feb  3 2015  6:32PM  Accession No:  [id number]  Reason:  headache      REPORT:  EXAM:  CT HEAD WO CON    INDICATION:   Headache. History of previous craniotomy for meningioma. COMPARISON: Postoperative MRI 10/21/2014. TECHNIQUE: Unenhanced CT of the head was performed using 5 mm images. Brain   and bone windows were generated. FINDINGS:  The ventricles are normal size and midline in position. Evidence of previous   left frontotemporal craniotomy noted. There is slight encephalomalacia in   the inferior portion of the left temporal lobe similar to the postoperative   findings on MRI. Remainder of the brain parenchyma is unremarkable. . There   is no significant white matter disease. There is no intracranial hemorrhage,   extra-axial collection, mass, mass effect or midline shift. The basilar   cisterns are open. No acute infarct is identified. The bone windows   demonstrate no abnormalities. The visualized portions of the paranasal   sinuses and mastoid air cells are clear. IMPRESSION: Stable postoperative findings in the cranial fossa on the left. No acute finding or hemorrhage. Signing/Reading Doctor: Eb Lorenzo ([id number])    Approved: Eb Lorenzo ([id number])  Feb  3 2015  6:51PM                                 MRI Results (maximum last 3): Results from [place] encounter on 08/22/19   MRI BRAIN W WO CONT    Narrative INDICATION: Meningioma [facility])     COMPARISON: December 10, 2018    TECHNIQUE: Multiplanar imaging of the brain was performed with and without IV  contrast.    FINDINGS:       Ventricles:  Midline, no hydrocephalus. Brain Parenchyma/Brainstem:  Prior left pterional craniotomy. Encephalomalacia  associated with the resection cavity in the anterior aspect of left middle  cranial fossa and temporal lobe is unchanged. No evidence of acute infarction. Intracranial Hemorrhage:  None. Basal Cisterns:  Normal.   Flow Voids:  Normal.  Post Contrast:  No significant change in small residual dural enhancement left  sphenoid wing which measures 13 x 9 mm (series 901 image 117). Additional Comments:  N/A. Impression IMPRESSION:  Postsurgical changes left middle cranial fossa with no significant change in  small amount of residual enhancing tumor left as above.              Results from Hospital Encounter encounter on 12/10/18   MRI BRAIN W WO CONT    Narrative INDICATION:  Previous meningioma resection     COMPARISON:  February 4, 2016    TECHNIQUE:  MR imaging of the brain was performed with sagittal T1, axial T1,  T2, FLAIR, GRE, DWI/ADC; pre and post contrast multiplanar T1 utilizing 13 mL  gadolinium. FINDINGS:       Ventricles:  Midline, no hydrocephalus. Brain Parenchyma/Brainstem:  Prior left pterional craniotomy. Unchanged  encephalomalacia/resection cavity anterior aspect of left middle cranial fossa  and temporal lobe. No acute infarction. Intracranial Hemorrhage:  None. Basal Cisterns:  Normal.   Flow Voids:  Normal.  Post Contrast:  No significant change in small area of residual dural  enhancement left sphenoid wing which measures 13 x 10 mm. Additional Comments:  N/A. Impression IMPRESSION:  Prior left pterional craniotomy and meningioma resection with no significant  change in small amount of residual enhancing tumor left middle cranial fossa as  above. Results from Abstract encounter on 01/10/17   MRI BRAIN W WO CONT       PET Results (maximum last 3): No results found for this or any previous visit. Assessment and Plan   Diagnoses and all orders for this visit:    1. Migraine with aura and without status migrainosus, not intractable    2. M[facility]oma ([facility]    3. History of ischemic right ICA stroke    Other orders  -     ondansetron (ZOFRAN ODT) 4 mg disintegrating tablet; Take 1 Tab by mouth every eight (8) hours as needed for Nausea (with headache). 62-year-old woman with a history of stroke secondary to symptomatic carotid disease and history of meningioma status post left craniotomy who is developing some migraine aura occurring typically each time with reversibility. She has had this before and this is not a new events but frequent now.   She would like to continue treating acutely with Aleve which I think is appropriate but I am going to add Zofran. She does not have headache each time. If she starts to have migrainous headache weekly or more I would like her to let me know as we might need to start some kind of preventative therapy. We talked about the MRI which is nonacute. If anything is irreversible or distinctly different I would want her to come to the hospital immediately. She is a stroke risk. Stroke education done. I would like to see her annually. I reviewed and decided to continue the current medications. This clinical note was dictated with an electronic dictation software that can make unintentional errors. If there are any questions, please contact me directly for clarification. A notice of this visit/encounter being completed has been sent electronically to the patient's PCP and/or referring provider.      2 [facility] [address]  Diplomate JEFFREYN

## 2020-02-19 ENCOUNTER — OFFICE VISIT (OUTPATIENT)
Dept: INTERNAL MEDICINE CLINIC | Age: 65
End: 2020-02-19

## 2020-02-19 VITALS
HEART RATE: 89 BPM | RESPIRATION RATE: 14 BRPM | BODY MASS INDEX: 30.21 KG/M2 | TEMPERATURE: 98.5 F | WEIGHT: 160 LBS | DIASTOLIC BLOOD PRESSURE: 77 MMHG | SYSTOLIC BLOOD PRESSURE: 119 MMHG | HEIGHT: 61 IN | OXYGEN SATURATION: 98 %

## 2020-02-19 DIAGNOSIS — R68.89 FLU-LIKE SYMPTOMS: Primary | ICD-10-CM

## 2020-02-19 DIAGNOSIS — J40 BRONCHITIS: ICD-10-CM

## 2020-02-19 LAB
FLUAV+FLUBV AG NOSE QL IA.RAPID: NEGATIVE POS/NEG
FLUAV+FLUBV AG NOSE QL IA.RAPID: NEGATIVE POS/NEG
VALID INTERNAL CONTROL?: YES

## 2020-02-19 RX ORDER — AZITHROMYCIN 250 MG/1
250 TABLET, FILM COATED ORAL SEE ADMIN INSTRUCTIONS
Qty: 6 TAB | Refills: 0 | Status: SHIPPED | OUTPATIENT
Start: 2020-02-19 | End: 2020-02-24

## 2020-02-19 RX ORDER — ALBUTEROL SULFATE 90 UG/1
2 AEROSOL, METERED RESPIRATORY (INHALATION)
Qty: 1 INHALER | Refills: 1 | Status: SHIPPED | OUTPATIENT
Start: 2020-02-19 | End: 2022-04-26 | Stop reason: SDUPTHER

## 2020-02-19 NOTE — PATIENT INSTRUCTIONS

## 2020-02-19 NOTE — PROGRESS NOTES
HPI:  Shayla Lawrence is a 60y.o. year old female who is here for a 2-week history of upper respiratory symptoms. Her grandson had flu about 2 weeks ago and has gotten better. She has had persistent nasal congestion with cough productive of thick yellow sputum and some occasional blood. Some wheezing. Some dyspnea on exertion. Low-grade fevers to 99. No chills. No night sweats. No nausea or vomiting. Some looser stools. She has been using Robitussin day and night with good success. Past Medical History:   Diagnosis Date    Anxiety     CAD (coronary artery disease)     Cerebral artery occlusion [facility] infarction ([facility]     Colon polyps     Depression     GERD (gastroesophageal reflux disease)     Hemorrhoids     Hepatitis C     genotype 1    History of kidney stones     Hx of amaurosis fugax     right eye, 2014    Liver disease 2011    hep c, [place] 2011    Meningioma [facility]) 2014    SURGERY TO BE SCHEDULED    Migraines     Osteoporosis 2008    Psychiatric disorder     ANXIETY AND DEPRESSION    PVD (peripheral vascular disease) ([facility] [address] 2/2014    W/STENT , DR Selene Robbins    Seizures ([facility] [address] 2013    ? SEIZURE, CONFUSION, TROUBLE WITH SPEECH       Past Surgical History:   Procedure Laterality Date    HX CHOLECYSTECTOMY  2001    HX COLONOSCOPY  10/27/2015    HX CRANIOTOMY  6/25/14    meningioma r medial sphenoid wing    HX GYN  1984    Tubal pregnancy    HX HYSTERECTOMY      HX OTHER SURGICAL  2/27/2014    Dr. Duane Shaper  2/2014    ABD AORTIC STENT    VASCULAR SURGERY PROCEDURE UNLIST Right 6/10/14    CAROTID        Prior to Admission medications    Medication Sig Start Date End Date Taking? Authorizing Provider   dextromethorphan-guaiFENesin (ROBITUSSIN COUGH-CHEST NELY DM)  mg cap Take  by mouth.  2/19/20  Yes Essence Condon MD   azithromycin (ZITHROMAX) 250 mg tablet Take 1 Tab by mouth See Admin Instructions for 5 days. 2/19/20 2/24/20 Yes Christopher Miranda MD   albuterol (PROVENTIL HFA, VENTOLIN HFA, PROAIR HFA) 90 mcg/actuation inhaler Take 2 Puffs by inhalation every four (4) hours as needed for Wheezing. 2/19/20  Yes Christopher Miranda MD   esomeprazole (NEXIUM) 40 mg capsule Take 1 Cap by mouth daily. 12/29/19  Yes Vernaosiel Slaughter NP   atorvastatin (LIPITOR) 10 mg tablet TAKE 1 TABLET NIGHTLY 10/11/19  Yes Marlon Funez III, MD   citalopram (CELEXA) 10 mg tablet  9/16/18  Yes Provider, Historical   dextroamphetamine-amphetamine (ADDERALL) 30 mg tablet Take 15 mg by mouth two (2) times a day. 8/10/17  Yes Provider, Historical   aspirin delayed-release 81 mg tablet Take 81 mg by mouth nightly. Yes Provider, Historical   cholecalciferol, vitamin D3, (VITAMIN D3) 2,000 unit Tab Take 2,000 Units by mouth daily. Yes Provider, Historical   clonazePAM (KLONOPIN) 0.5 mg tablet Take 0.5 mg by mouth nightly. Yes Provider, Historical   ondansetron (ZOFRAN ODT) 4 mg disintegrating tablet Take 1 Tab by mouth every eight (8) hours as needed for Nausea (with headache).  12/16/19   Cecy Phillips DO       Social History     Socioeconomic History    Marital status:      Spouse name: Not on file    Number of children: Not on file    Years of education: Not on file    Highest education level: Not on file   Occupational History    Not on file   Social Needs    Financial resource strain: Not on file    Food insecurity:     Worry: Not on file     Inability: Not on file    Transportation needs:     Medical: Not on file     Non-medical: Not on file   Tobacco Use    Smoking status: Current Every Day Smoker     Packs/day: 0.25     Years: 43.00     Pack years: 10.75     Types: Cigarettes    Smokeless tobacco: Never Used    Tobacco comment: joined [facility] for [employer] and Sexual Activity    Alcohol use: No     Alcohol/week: 0.0 standard drinks    Drug use: No    Sexual activity: Yes     Partners: Male     Birth control/protection: Surgical   Lifestyle    Physical activity:     Days per week: Not on file     Minutes per session: Not on file    Stress: Not on file   Relationships    Social connections:     Talks on phone: Not on file     Gets together: Not on file     Attends [employer] service: Not on file     Active member of club or organization: Not on file     Attends meetings of clubs or organizations: Not on file     Relationship status: Not on file    Intimate partner violence:     Fear of current or ex partner: Not on file     Emotionally abused: Not on file     Physically abused: Not on file     Forced sexual activity: Not on file   Other Topics Concern    Not on file   Social History Narrative    Not on file          ROS  Per HPI    Visit Vitals  /77   Pulse 89   Temp 98.5 °F (36.9 °C) (Oral)   Resp 14   Ht 5' 1\" (1.549 m)   Wt 160 lb (72.6 kg)   SpO2 98%   BMI 30.23 kg/m²         Physical Exam   Physical Examination: General appearance - alert, well appearing, and in no distress  Ears - bilateral TM's and external ear canals normal  Mouth - mucous membranes moist, pharynx normal without lesions  Neck - supple, no significant adenopathy  Lymphatics - no palpable lymphadenopathy, no hepatosplenomegaly  Chest -scattered upper airway rhonchi. No wheeze. Heart - normal rate and regular rhythm  Abdomen - soft, nontender, nondistended, no masses or organomegaly      Assessment/Plan:  Diagnoses and all orders for this visit:    1. Flu-like symptoms  -     AMB POC BISMARK INFLUENZA A/B TEST    2. Bronchitis-at this point will treat for bacterial process. She will continue to use the expectorants. Force fluids. Let me know if not improving and may add steroids. Other orders  -     azithromycin (ZITHROMAX) 250 mg tablet; Take 1 Tab by mouth See Admin Instructions for 5 days. -     albuterol (PROVENTIL HFA, VENTOLIN HFA, PROAIR HFA) 90 mcg/actuation inhaler;  Take 2 Puffs by inhalation every four (4) hours as needed for Wheezing. Advised her to call back or return to office if symptoms worsen/change/persist.  Discussed expected course/resolution/complications of diagnosis in detail with patient. Medication risks/benefits/costs/interactions/alternatives discussed with patient. She was given an after visit summary which includes diagnoses, current medications, & vitals. She expressed understanding with the diagnosis and plan.

## 2020-07-20 RX ORDER — ESOMEPRAZOLE MAGNESIUM 40 MG/1
40 CAPSULE, DELAYED RELEASE ORAL DAILY
Qty: 90 CAP | Refills: 0 | Status: SHIPPED | OUTPATIENT
Start: 2020-07-20 | End: 2020-07-23 | Stop reason: SDUPTHER

## 2020-07-20 RX ORDER — ATORVASTATIN CALCIUM 10 MG/1
TABLET, FILM COATED ORAL
Qty: 90 TAB | Refills: 3 | Status: SHIPPED | OUTPATIENT
Start: 2020-07-20 | End: 2021-05-28 | Stop reason: SDUPTHER

## 2020-10-12 ENCOUNTER — HOSPITAL ENCOUNTER (OUTPATIENT)
Dept: GENERAL RADIOLOGY | Age: 65
Discharge: HOME | End: 2020-10-12
Attending: INTERNAL MEDICINE
Payer: COMMERCIAL

## 2020-10-12 ENCOUNTER — OFFICE VISIT (OUTPATIENT)
Dept: INTERNAL MEDICINE CLINIC | Age: 65
End: 2020-10-12
Attending: INTERNAL MEDICINE
Payer: COMMERCIAL

## 2020-10-12 VITALS
SYSTOLIC BLOOD PRESSURE: 138 MMHG | BODY MASS INDEX: 29.83 KG/M2 | OXYGEN SATURATION: 95 % | HEART RATE: 86 BPM | HEIGHT: 61 IN | TEMPERATURE: 97.5 F | RESPIRATION RATE: 16 BRPM | WEIGHT: 158 LBS | DIASTOLIC BLOOD PRESSURE: 88 MMHG

## 2020-10-12 DIAGNOSIS — Z23 ENCOUNTER FOR IMMUNIZATION: ICD-10-CM

## 2020-10-12 DIAGNOSIS — Z00.00 WELL WOMAN EXAM (NO GYNECOLOGICAL EXAM): ICD-10-CM

## 2020-10-12 DIAGNOSIS — D32.9 MENINGIOMA (HCC): ICD-10-CM

## 2020-10-12 DIAGNOSIS — F32.1 MODERATE MAJOR DEPRESSION (HCC): ICD-10-CM

## 2020-10-12 DIAGNOSIS — R05.9 COUGH: ICD-10-CM

## 2020-10-12 DIAGNOSIS — M81.0 AGE-RELATED OSTEOPOROSIS WITHOUT CURRENT PATHOLOGICAL FRACTURE: ICD-10-CM

## 2020-10-12 DIAGNOSIS — E78.2 MIXED HYPERLIPIDEMIA: ICD-10-CM

## 2020-10-12 DIAGNOSIS — K21.9 GASTROESOPHAGEAL REFLUX DISEASE: ICD-10-CM

## 2020-10-12 DIAGNOSIS — Z12.31 SCREENING MAMMOGRAM, ENCOUNTER FOR: Primary | ICD-10-CM

## 2020-10-12 PROCEDURE — 71046 X-RAY EXAM CHEST 2 VIEWS: CPT

## 2020-10-12 PROCEDURE — 90471 IMMUNIZATION ADMIN: CPT | Performed by: INTERNAL MEDICINE

## 2020-10-12 PROCEDURE — 99396 PREV VISIT EST AGE 40-64: CPT | Performed by: INTERNAL MEDICINE

## 2020-10-12 PROCEDURE — 90686 IIV4 VACC NO PRSV 0.5 ML IM: CPT | Performed by: INTERNAL MEDICINE

## 2020-10-12 RX ORDER — ESOMEPRAZOLE MAGNESIUM 40 MG/1
40 CAPSULE, DELAYED RELEASE ORAL DAILY
Qty: 90 CAP | Refills: 0 | Status: SHIPPED | OUTPATIENT
Start: 2020-10-12 | End: 2021-02-03

## 2020-10-12 NOTE — PROGRESS NOTES
Subjective:   63 y.o. female for Well Woman Check. Her gyne and breast care is done elsewhere by her Ob-Gyne physician. 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 Indications: gastroesophageal reflux disease 90 Cap 0    atorvastatin (LIPITOR) 10 mg tablet TAKE 1 TABLET NIGHTLY 90 Tab 3    albuterol (PROVENTIL HFA, VENTOLIN HFA, PROAIR HFA) 90 mcg/actuation inhaler Take 2 Puffs by inhalation every four (4) hours as needed for Wheezing. 1 Inhaler 1    citalopram (CELEXA) 10 mg tablet       dextroamphetamine-amphetamine (ADDERALL) 30 mg tablet Take 15 mg by mouth two (2) times a day.  0    aspirin delayed-release 81 mg tablet Take 81 mg by mouth nightly.  cholecalciferol, vitamin D3, (VITAMIN D3) 2,000 unit Tab Take 2,000 Units by mouth daily.  clonazePAM (KLONOPIN) 0.5 mg tablet Take 0.5 mg by mouth nightly.  ondansetron (ZOFRAN ODT) 4 mg disintegrating tablet Take 1 Tab by mouth every eight (8) hours as needed for Nausea (with headache).  30 Tab 1     Allergies   Allergen Reactions    Tetracycline Other (comments)     GI upset     Past Medical History:   Diagnosis Date    Anxiety     CAD (coronary artery disease)     Cerebral artery occlusion with cerebral infarction (HCC)     Colon polyps     Depression     GERD (gastroesophageal reflux disease)     Hemorrhoids     Hepatitis C     genotype 1    History of kidney stones     Hx of amaurosis fugax     right eye, 2014    Liver disease 2011    hep c, [place] 2011    Meningioma [facility]) 2014    SURGERY TO BE SCHEDULED    Migraines     Osteoporosis 2008    Psychiatric disorder     ANXIETY AND DEPRESSION    PVD (peripheral vascular disease) ([facility] [address] 2/2014    W/STENT , DR Sacha Mckeon    Seizures ([facility] [address] 2013    ?  SEIZURE, CONFUSION, TROUBLE WITH SPEECH     Past Surgical History:   Procedure Laterality Date    HX CHOLECYSTECTOMY  2001    HX COLONOSCOPY  10/27/2015    HX CRANIOTOMY  6/25/14    meningioma r medial sphenoid wing    HX GI  2002    gall bladder removal    HX GYN  1984    Tubal pregnancy    HX HYSTERECTOMY      HX OTHER SURGICAL  2/27/2014    Dr. Mitzi Haile      [facility] 1 UNLIST  2/2014    ABD AORTIC STENT    VASCULAR SURGERY PROCEDURE UNLIST Right 6/10/14    CAROTID      Family History   Problem Relation Age of Onset    Heart Disease Mother         Heart attack and triple bypass    Anxiety Mother     Cancer Mother         Lung    Heart Surgery Mother         CABG X3 VESSEL    Alcohol abuse Mother         Quit Drinking 1986    Hypertension Mother         on medication    Lung Disease Mother         COPD    Anxiety Sister     Alcohol abuse Father         Never stoped    Asthma Father         Had since a child    Cancer Sister         Vaginal    Asthma Sister     Heart Disease Sister         mild heart attach 2018    Hypertension Sister         on medication    Lung Disease Sister         COPD    Diabetes Sister     Heart Disease Maternal Grandmother         heart aneurysm 46 of age 56    Heart Disease Paternal Grandmother         several heart attacks    Hypertension Paternal Grandmother         on medication    Stroke Paternal Grandmother         Multiple strokes in her 66's    Arthritis-osteo Paternal Grandmother         arthritis pain in hands    Heart Disease Paternal Grandfather         major heart attack cause of death    Lung Disease Maternal Grandfather         COPD    Heart Disease Maternal Grandfather         cardiac arrest due to Cor pulmonale, Emphysema and COPD    Anesth Problems Neg Hx      Social History     Tobacco Use    Smoking status: Current Every Day Smoker     Packs/day: 0.50     Years: 43.00     Pack years: 21.50     Types: Cigarettes    Smokeless tobacco: Never Used    Tobacco comment: joined [employer]   Substance Use Topics    Alcohol use: No     Alcohol/week: 0.0 standard drinks        Lab Results   Component Value Date/Time    WBC 9.6 12/12/2019 12:23 PM    HGB 13.4 12/12/2019 12:23 PM    Hemoglobin (POC) 13.9 05/13/2011 11:36 AM    HCT 41.3 12/12/2019 12:23 PM    Hematocrit (POC) 41 05/13/2011 11:36 AM    PLATELET 019 54/69/3225 12:23 PM    MCV 90 12/12/2019 12:23 PM     Lab Results   Component Value Date/Time    Cholesterol, total 164 12/12/2019 12:23 PM    HDL Cholesterol 45 12/12/2019 12:23 PM    LDL, calculated 100 (H) 12/12/2019 12:23 PM    Triglyceride 96 12/12/2019 12:23 PM    CHOL/HDL Ratio 3.8 02/04/2015 03:25 AM     Lab Results   Component Value Date/Time    ALT (SGPT) 9 12/12/2019 12:23 PM    Alk.  phosphatase 141 (H) 12/12/2019 12:23 PM    Bilirubin, direct <0.1 07/21/2016 11:27 PM    Bilirubin, total 0.3 12/12/2019 12:23 PM    Albumin 4.0 12/12/2019 12:23 PM    Protein, total 6.8 12/12/2019 12:23 PM    INR 1.1 02/04/2015 03:25 AM    Prothrombin time 10.6 02/04/2015 03:25 AM    PLATELET 303 25/73/0007 12:23 PM     Lab Results   Component Value Date/Time    GFR est non-AA 64 12/12/2019 12:23 PM    GFRNA, POC >60 05/13/2011 11:36 AM    GFR est AA 74 12/12/2019 12:23 PM    GFRAA, POC >60 05/13/2011 11:36 AM    Creatinine 0.95 12/12/2019 12:23 PM    Creatinine (POC) 0.6 05/13/2011 11:36 AM    BUN 15 12/12/2019 12:23 PM    BUN (POC) 8 (L) 05/13/2011 11:36 AM    Sodium 139 12/12/2019 12:23 PM    Sodium (POC) 140 05/13/2011 11:36 AM    Potassium 4.8 12/12/2019 12:23 PM    Potassium (POC) 4.3 05/13/2011 11:36 AM Chloride 99 12/12/2019 12:23 PM    Chloride (POC) 105 05/13/2011 11:36 AM    CO2 25 12/12/2019 12:23 PM     Lab Results   Component Value Date/Time    TSH 2.210 12/12/2019 12:23 PM    TSH 3.370 05/20/2013 08:36 AM    T3 Uptake 21 (L) 09/16/2011 12:00 AM    T4, Total 16.6 (H) 09/16/2011 12:00 AM      Lab Results   Component Value Date/Time    Glucose 98 12/12/2019 12:23 PM    Glucose (POC) 94 05/13/2011 11:36 AM         Specific concerns today: Some persistent cough. Some sputum production that is white in color. Some mild dyspnea on exertion. No nausea or vomiting. No other new symptoms. .    Review of Systems  A comprehensive review of systems was negative except for that written in the HPI. Objective:   Blood pressure 138/88, pulse 86, temperature 97.5 °F (36.4 °C), temperature source Temporal, resp. rate 16, height 5' 1\" (1.549 m), weight 158 lb (71.7 kg), SpO2 95 %.    Physical Examination:   General appearance - alert, well appearing, and in no distress  Ears - bilateral TM's and external ear canals normal  Nose - normal and patent, no erythema, discharge or polyps  Mouth - mucous membranes moist, pharynx normal without lesions  Neck - supple, no significant adenopathy  Lymphatics - no palpable lymphadenopathy, no hepatosplenomegaly  Chest - clear to auscultation, no wheezes, rales or rhonchi, symmetric air entry  Heart - normal rate, regular rhythm, normal S1, S2, no murmurs, rubs, clicks or gallops  Abdomen - soft, nontender, nondistended, no masses or organomegaly  Neurological - alert, oriented, normal speech, no focal findings or movement disorder noted  Musculoskeletal - no joint tenderness, deformity or swelling  Extremities - peripheral pulses normal, no pedal edema, no clubbing or cyanosis     Assessment/Plan:     lose weight, stop smoking (advice and handout given), bring BP log to office visit, follow low fat diet, follow low salt diet, routine labs ordered  Diagnoses and all orders for this visit: 1. Screening mammogram, encounter for  -     [facility] MAMMO BI SCREENING INCL CAD; Future    2. Gastroesophageal reflux disease stable. Continue Nexium.  -     esomeprazole (NEXIUM) 40 mg capsule; Take 1 Cap by mouth daily. Indications: gastroesophageal reflux disease    3. Age-related osteoporosis without current pathological fracture-osteoporotic 5 years ago. She attempted oral bisphosphonates from the gynecologist and was unable to tolerate. Repeat bone density and consider treatment. -     DEXA BONE DENSITY STUDY AXIAL; Future    4. Well woman exam (no gynecological exam)  -     METABOLIC PANEL, COMPREHENSIVE  -     CBC WITH AUTOMATED DIFF  -     LIPID PANEL  -     TSH RFX ON ABNORMAL TO FREE T4    5. Mixed hyperlipidemia -  LDL goal of 100. Check labs to be sure that is met. Tolerating statins. 6. Meningioma (HCC)-followed by neurosurgery. 7. Moderate major depression (HCC)-followed by psychiatry. 8. Cough-?  COPD. Will obtain a chest x-ray and likely pulmonary evaluation. -     XR CHEST PA LAT; Future    9. Encounter for immunization  -     INFLUENZA VIRUS VAC QUAD,SPLIT,PRESV FREE SYRINGE IM       Follow-up and Dispositions    · Return in about 1 year (around 10/12/2021) for CPE. Advised her to call back or return to office if symptoms worsen/change/persist.  Discussed expected course/resolution/complications of diagnosis in detail with patient. Medication risks/benefits/costs/interactions/alternatives discussed with patient. She was given an after visit summary which includes diagnoses, current medications, & vitals. She expressed understanding with the diagnosis and plan.

## 2020-10-22 ENCOUNTER — HOSPITAL ENCOUNTER (OUTPATIENT)
Dept: MAMMOGRAPHY | Age: 65
Discharge: HOME | End: 2020-10-22
Attending: INTERNAL MEDICINE
Payer: COMMERCIAL

## 2020-10-22 DIAGNOSIS — Z12.31 SCREENING MAMMOGRAM, ENCOUNTER FOR: ICD-10-CM

## 2020-10-22 DIAGNOSIS — M81.0 AGE-RELATED OSTEOPOROSIS WITHOUT CURRENT PATHOLOGICAL FRACTURE: ICD-10-CM

## 2020-10-22 PROCEDURE — 77067 SCR MAMMO BI INCL CAD: CPT

## 2020-10-22 PROCEDURE — 77080 DXA BONE DENSITY AXIAL: CPT

## 2021-02-02 DIAGNOSIS — K21.9 GASTROESOPHAGEAL REFLUX DISEASE: ICD-10-CM

## 2021-02-03 RX ORDER — ESOMEPRAZOLE MAGNESIUM 40 MG/1
CAPSULE, DELAYED RELEASE ORAL
Qty: 90 CAP | Refills: 0 | Status: SHIPPED | OUTPATIENT
Start: 2021-02-03 | End: 2021-05-28 | Stop reason: SDUPTHER

## 2021-04-06 ENCOUNTER — TRANSCRIBE ORDER (OUTPATIENT)
Dept: SCHEDULING | Age: 66
End: 2021-04-06

## 2021-04-06 DIAGNOSIS — R51.9 HEAD ACHE: Primary | ICD-10-CM

## 2021-05-28 DIAGNOSIS — K21.9 GASTROESOPHAGEAL REFLUX DISEASE: ICD-10-CM

## 2021-05-28 RX ORDER — ATORVASTATIN CALCIUM 10 MG/1
TABLET, FILM COATED ORAL
Qty: 90 TABLET | Refills: 3 | Status: SHIPPED | OUTPATIENT
Start: 2021-05-28 | End: 2021-07-26 | Stop reason: DRUGHIGH

## 2021-05-28 RX ORDER — ESOMEPRAZOLE MAGNESIUM 40 MG/1
40 CAPSULE, DELAYED RELEASE ORAL DAILY
Qty: 90 CAPSULE | Refills: 0 | Status: SHIPPED | OUTPATIENT
Start: 2021-05-28 | End: 2021-08-30

## 2021-06-11 ENCOUNTER — OFFICE VISIT (OUTPATIENT)
Dept: INTERNAL MEDICINE CLINIC | Age: 66
End: 2021-06-11
Payer: COMMERCIAL

## 2021-06-11 VITALS
WEIGHT: 150 LBS | RESPIRATION RATE: 16 BRPM | BODY MASS INDEX: 28.32 KG/M2 | DIASTOLIC BLOOD PRESSURE: 71 MMHG | TEMPERATURE: 97.8 F | OXYGEN SATURATION: 100 % | SYSTOLIC BLOOD PRESSURE: 126 MMHG | HEART RATE: 79 BPM | HEIGHT: 61 IN

## 2021-06-11 DIAGNOSIS — R39.9 UTI SYMPTOMS: Primary | ICD-10-CM

## 2021-06-11 DIAGNOSIS — N30.01 ACUTE CYSTITIS WITH HEMATURIA: ICD-10-CM

## 2021-06-11 LAB
BILIRUB UR QL STRIP: NORMAL
GLUCOSE UR-MCNC: NEGATIVE MG/DL
KETONES P FAST UR STRIP-MCNC: NEGATIVE MG/DL
PH UR STRIP: 5.5 [PH] (ref 4.6–8)
PROT UR QL STRIP: NORMAL
SP GR UR STRIP: 1.02 (ref 1–1.03)
UA UROBILINOGEN AMB POC: NORMAL (ref 0.2–1)
URINALYSIS CLARITY POC: CLEAR
URINALYSIS COLOR POC: YELLOW
URINE BLOOD POC: NORMAL
URINE LEUKOCYTES POC: NORMAL
URINE NITRITES POC: NEGATIVE

## 2021-06-11 PROCEDURE — 99213 OFFICE O/P EST LOW 20 MIN: CPT | Performed by: INTERNAL MEDICINE

## 2021-06-11 PROCEDURE — 81003 URINALYSIS AUTO W/O SCOPE: CPT | Performed by: INTERNAL MEDICINE

## 2021-06-11 RX ORDER — NITROFURANTOIN 25; 75 MG/1; MG/1
100 CAPSULE ORAL 2 TIMES DAILY
Qty: 14 CAPSULE | Refills: 0 | Status: SHIPPED | OUTPATIENT
Start: 2021-06-11 | End: 2021-07-26 | Stop reason: ALTCHOICE

## 2021-06-11 RX ORDER — DENOSUMAB 60 MG/ML
60 INJECTION SUBCUTANEOUS
COMMUNITY

## 2021-06-11 NOTE — PATIENT INSTRUCTIONS
Urinary Tract Infection (UTI) in Women: Care Instructions  Overview     A urinary tract infection, or UTI, is a general term for an infection anywhere between the kidneys and the urethra (where urine comes out). Most UTIs are bladder infections. They often cause pain or burning when you urinate. UTIs are caused by bacteria and can be cured with antibiotics. Be sure to complete your treatment so that the infection does not get worse. Follow-up care is a key part of your treatment and safety. Be sure to make and go to all appointments, and call your doctor if you are having problems. It's also a good idea to know your test results and keep a list of the medicines you take. How can you care for yourself at home? · Take your antibiotics as directed. Do not stop taking them just because you feel better. You need to take the full course of antibiotics. · Drink extra water and other fluids for the next day or two. This will help make the urine less concentrated and help wash out the bacteria that are causing the infection. (If you have kidney, heart, or liver disease and have to limit fluids, talk with your doctor before you increase the amount of fluids you drink.)  · Avoid drinks that are carbonated or have caffeine. They can irritate the bladder. · Urinate often. Try to empty your bladder each time. · To relieve pain, take a hot bath or lay a heating pad set on low over your lower belly or genital area. Never go to sleep with a heating pad in place. To prevent UTIs  · Drink plenty of water each day. This helps you urinate often, which clears bacteria from your system. (If you have kidney, heart, or liver disease and have to limit fluids, talk with your doctor before you increase the amount of fluids you drink.)  · Urinate when you need to. · If you are sexually active, urinate right after you have sex. · Change sanitary pads often.   · Avoid douches, bubble baths, feminine hygiene sprays, and other feminine hygiene products that have deodorants. · After going to the bathroom, wipe from front to back. When should you call for help? Call your doctor now or seek immediate medical care if:    · Symptoms such as fever, chills, nausea, or vomiting get worse or appear for the first time.     · You have new pain in your back just below your rib cage. This is called flank pain.     · There is new blood or pus in your urine.     · You have any problems with your antibiotic medicine. Watch closely for changes in your health, and be sure to contact your doctor if:    · You are not getting better after taking an antibiotic for 2 days.     · Your symptoms go away but then come back. Where can you learn more? Go to http://www.gray.com/  Enter X407 in the search box to learn more about \"Urinary Tract Infection (UTI) in Women: Care Instructions. \"  Current as of: June 29, 2020               Content Version: 12.8  © 2006-2021 [employer]. Care instructions adapted under license by [employer] (which disclaims liability or warranty for this information). If you have questions about a medical condition or this instruction, always ask your healthcare professional. [address] any warranty or liability for your use of this information.

## 2021-06-11 NOTE — PROGRESS NOTES
HPI:  Ethan Ellis is a 63y.o. year old female who is here for a 1 week history of pain across the upper back. Over the last few days she has had increased urinary frequency with dysuria as well. Some chills when she urinates but no shaking chills. No diaphoresis. No nausea or vomiting. No documented fever. No hematuria. Past Medical History:   Diagnosis Date    Anxiety     CAD (coronary artery disease)     [facility] occlusion [facility] [address]     Colon polyps     Depression     GERD (gastroesophageal reflux disease)     Hemorrhoids     Hepatitis C     genotype 1    History of kidney stones     Hx of amaurosis fugax     right eye, 2014    Liver disease 2011    hep c, [place] 2011    Meningioma [facility]) 2014    SURGERY TO BE SCHEDULED    Migraines     Osteoporosis 2008    Psychiatric disorder     ANXIETY AND DEPRESSION    PVD (peripheral vascular disease) ([facility] [address] 2/2014    W/STENT , DR Evan Newton    Seizures ([facility] [address] 2013    ? SEIZURE, CONFUSION, TROUBLE WITH SPEECH       Past Surgical History:   Procedure Laterality Date    HX CHOLECYSTECTOMY  2001    HX COLONOSCOPY  10/27/2015    HX CRANIOTOMY  6/25/14    meningioma r medial sphenoid wing    HX GI  2002    gall bladder removal    HX GYN  1984    Tubal pregnancy    HX HYSTERECTOMY      HX OTHER SURGICAL  2/27/2014    Dr. Shashi Fischer  2/2014    ABD AORTIC STENT    VASCULAR SURGERY PROCEDURE UNLIST Right 6/10/14    CAROTID        Prior to Admission medications    Medication Sig Start Date End Date Taking? Authorizing Provider   denosumab (Prolia) 60 mg/mL injection 60 mg by SubCUTAneous route. Yes Provider, Historical   nitrofurantoin, macrocrystal-monohydrate, (MACROBID) 100 mg capsule Take 1 Capsule by mouth two (2) times a day.  6/11/21  Yes Soy Callahan MD   atorvastatin (LIPITOR) 10 mg tablet TAKE 1 TABLET NIGHTLY 5/28/21  Yes Soy Callahan MD esomeprazole (NEXIUM) 40 mg capsule Take 1 Capsule by mouth daily. 5/28/21  Yes Gustavo Swanson MD   albuterol (PROVENTIL HFA, VENTOLIN HFA, PROAIR HFA) 90 mcg/actuation inhaler Take 2 Puffs by inhalation every four (4) hours as needed for Wheezing. 2/19/20  Yes Gustavo Swanson MD   citalopram (CELEXA) 10 mg tablet  9/16/18  Yes Provider, Historical   dextroamphetamine-amphetamine (ADDERALL) 30 mg tablet Take 15 mg by mouth two (2) times a day. 8/10/17  Yes Provider, Historical   aspirin delayed-release 81 mg tablet Take 81 mg by mouth nightly. Yes Provider, Historical   cholecalciferol, vitamin D3, (VITAMIN D3) 2,000 unit Tab Take 2,000 Units by mouth daily. Yes Provider, Historical   clonazePAM (KLONOPIN) 0.5 mg tablet Take 0.5 mg by mouth nightly. Yes Provider, Historical   ondansetron (ZOFRAN ODT) 4 mg disintegrating tablet Take 1 Tab by mouth every eight (8) hours as needed for Nausea (with headache).   Patient not taking: Reported on 6/11/2021 12/16/19   Erasmo Perez DO       Social History     Socioeconomic History    Marital status:      Spouse name: Not on file    Number of children: Not on file    Years of education: Not on file    Highest education level: Not on file   Occupational History    Not on file   Tobacco Use    Smoking status: Current Every Day Smoker     Packs/day: 0.50     Years: 43.00     Pack years: 21.50     Types: Cigarettes    Smokeless tobacco: Never Used    Tobacco comment: joined 2001 [facility] for [employer] and Sexual Activity    Alcohol use: No     Alcohol/week: 0.0 standard drinks    Drug use: No    Sexual activity: Yes     Partners: Male     Birth control/protection: Surgical, None   Other Topics Concern    Not on file   Social History Narrative    Not on file     Social Determinants of Health     Financial Resource Strain:     Difficulty of Paying Living Expenses:    Food Insecurity:     Worried About Running Out of Food in the Last Year:     Ran Out of Food in the Last Year:    Transportation Needs:     Lack of Transportation (Medical):      Lack of Transportation (Non-Medical):    Physical Activity:     Days of Exercise per Week:     Minutes of Exercise per Session:    Stress:     Feeling of Stress :    Social Connections:     Frequency of Communication with Friends and Family:     Frequency of Social Gatherings with Friends and Family:     Attends Congregational Services:     Active Member of Clubs or Organizations:     Attends Club or Organization Meetings:     Marital Status:    Intimate Partner Violence:     Fear of Current or Ex-Partner:     Emotionally Abused:     Physically Abused:     Sexually Abused:           ROS  Per HPI    Visit Vitals  /71   Pulse 79   Temp 97.8 °F (36.6 °C) (Temporal)   Resp 16   Ht 5' 1\" (1.549 m)   Wt 150 lb (68 kg)   SpO2 100%   BMI 28.34 kg/m²         Physical Exam   Physical Examination: General appearance - alert, well appearing, and in no distress  Chest - clear to auscultation, no wheezes, rales or rhonchi, symmetric air entry  Heart - normal rate, regular rhythm, normal S1, S2, no murmurs, rubs, clicks or gallops  Abdomen - soft, nontender, nondistended, no masses or organomegaly  Back exam - full range of motion, no tenderness, palpable spasm or pain on motion    Results for orders placed or performed in visit on 06/11/21   AMB POC URINALYSIS DIP STICK AUTO W/O MICRO   Result Value Ref Range    Color (UA POC) Yellow     Clarity (UA POC) Clear     Glucose (UA POC) Negative Negative    Bilirubin (UA POC) 1+ Negative    Ketones (UA POC) Negative Negative    Specific gravity (UA POC) 1.025 1.001 - 1.035    Blood (UA POC) 2+ Negative    pH (UA POC) 5.5 4.6 - 8.0    Protein (UA POC) Trace Negative    Urobilinogen (UA POC) 0.2 mg/dL 0.2 - 1    Nitrites (UA POC) Negative Negative    Leukocyte esterase (UA POC) 1+ Negative           Assessment/Plan:  Diagnoses and all orders for this visit:    1. UTI symptoms we will treat with antibiotics. Will send for culture. She may need a repeat urinalysis to evaluate for the occult hematuria if present.  -     URINALYSIS W/MICROSCOPIC; Future  -     CULTURE, URINE; Future  -     AMB POC URINALYSIS DIP STICK AUTO W/O MICRO    2. Acute cystitis with hematuria    Other orders  -     nitrofurantoin, macrocrystal-monohydrate, (MACROBID) 100 mg capsule; Take 1 Capsule by mouth two (2) times a day. Advised her to call back or return to office if symptoms worsen/change/persist.  Discussed expected course/resolution/complications of diagnosis in detail with patient. Medication risks/benefits/costs/interactions/alternatives discussed with patient. She was given an after visit summary which includes diagnoses, current medications, & vitals. She expressed understanding with the diagnosis and plan.

## 2021-06-12 LAB
ALBUMIN SERPL-MCNC: 3.9 G/DL (ref 3.8–4.8)
ALBUMIN/GLOB SERPL: 1.4 {RATIO} (ref 1.2–2.2)
ALP SERPL-CCNC: 138 IU/L (ref 48–121)
ALT SERPL-CCNC: 6 IU/L (ref 0–32)
APPEARANCE UR: ABNORMAL
AST SERPL-CCNC: 13 IU/L (ref 0–40)
BACTERIA URNS QL MICRO: ABNORMAL /HPF
BASOPHILS # BLD AUTO: 0.1 X10E3/UL (ref 0–0.2)
BASOPHILS NFR BLD AUTO: 1 %
BILIRUB SERPL-MCNC: <0.2 MG/DL (ref 0–1.2)
BILIRUB UR QL: NEGATIVE
BUN SERPL-MCNC: 10 MG/DL (ref 8–27)
BUN/CREAT SERPL: 14 (ref 12–28)
CALCIUM SERPL-MCNC: 8.6 MG/DL (ref 8.7–10.3)
CHLORIDE SERPL-SCNC: 103 MMOL/L (ref 96–106)
CHOLEST SERPL-MCNC: 149 MG/DL (ref 100–199)
CO2 SERPL-SCNC: 23 MMOL/L (ref 20–29)
COLOR UR: ABNORMAL
CREAT SERPL-MCNC: 0.73 MG/DL (ref 0.57–1)
EOSINOPHIL # BLD AUTO: 0.2 X10E3/UL (ref 0–0.4)
EOSINOPHIL NFR BLD AUTO: 2 %
EPITH CASTS URNS QL MICRO: ABNORMAL /LPF
ERYTHROCYTE [DISTWIDTH] IN BLOOD BY AUTOMATED COUNT: 13.5 % (ref 11.7–15.4)
GLOBULIN SER CALC-MCNC: 2.8 G/DL (ref 1.5–4.5)
GLUCOSE SERPL-MCNC: 99 MG/DL (ref 65–99)
GLUCOSE UR STRIP.AUTO-MCNC: NEGATIVE MG/DL
HCT VFR BLD AUTO: 41.2 % (ref 34–46.6)
HDLC SERPL-MCNC: 41 MG/DL
HGB BLD-MCNC: 13.7 G/DL (ref 11.1–15.9)
HGB UR QL STRIP: ABNORMAL
HYALINE CASTS URNS QL MICRO: ABNORMAL /LPF (ref 0–5)
IMM GRANULOCYTES # BLD AUTO: 0 X10E3/UL (ref 0–0.1)
IMM GRANULOCYTES NFR BLD AUTO: 0 %
KETONES UR QL STRIP.AUTO: NEGATIVE MG/DL
LDLC SERPL CALC-MCNC: 91 MG/DL (ref 0–99)
LEUKOCYTE ESTERASE UR QL STRIP.AUTO: ABNORMAL
LYMPHOCYTES # BLD AUTO: 2.5 X10E3/UL (ref 0.7–3.1)
LYMPHOCYTES NFR BLD AUTO: 27 %
MCH RBC QN AUTO: 30.2 PG (ref 26.6–33)
MCHC RBC AUTO-ENTMCNC: 33.3 G/DL (ref 31.5–35.7)
MCV RBC AUTO: 91 FL (ref 79–97)
MONOCYTES # BLD AUTO: 0.6 X10E3/UL (ref 0.1–0.9)
MONOCYTES NFR BLD AUTO: 6 %
NEUTROPHILS # BLD AUTO: 6.1 X10E3/UL (ref 1.4–7)
NEUTROPHILS NFR BLD AUTO: 64 %
NITRITE UR QL STRIP.AUTO: POSITIVE
PH UR STRIP: 5.5 [PH] (ref 5–8)
PLATELET # BLD AUTO: 232 X10E3/UL (ref 150–450)
POTASSIUM SERPL-SCNC: 4.7 MMOL/L (ref 3.5–5.2)
PROT SERPL-MCNC: 6.7 G/DL (ref 6–8.5)
PROT UR STRIP-MCNC: ABNORMAL MG/DL
RBC # BLD AUTO: 4.54 X10E6/UL (ref 3.77–5.28)
RBC #/AREA URNS HPF: ABNORMAL /HPF (ref 0–5)
SODIUM SERPL-SCNC: 139 MMOL/L (ref 134–144)
SP GR UR REFRACTOMETRY: 1.02 (ref 1–1.03)
TRIGL SERPL-MCNC: 88 MG/DL (ref 0–149)
TSH SERPL DL<=0.005 MIU/L-ACNC: 3.35 UIU/ML (ref 0.45–4.5)
UROBILINOGEN UR QL STRIP.AUTO: 0.2 EU/DL (ref 0.2–1)
VLDLC SERPL CALC-MCNC: 17 MG/DL (ref 5–40)
WBC # BLD AUTO: 9.5 X10E3/UL (ref 3.4–10.8)
WBC URNS QL MICRO: >100 /HPF (ref 0–4)

## 2021-06-14 LAB
BACTERIA SPEC CULT: ABNORMAL
CC UR VC: ABNORMAL
SERVICE CMNT-IMP: ABNORMAL

## 2021-07-09 ENCOUNTER — HOSPITAL ENCOUNTER (OUTPATIENT)
Dept: MRI IMAGING | Age: 66
Discharge: HOME | End: 2021-07-09
Attending: NEUROLOGICAL SURGERY
Payer: COMMERCIAL

## 2021-07-09 VITALS — BODY MASS INDEX: 28.34 KG/M2 | WEIGHT: 150 LBS

## 2021-07-09 DIAGNOSIS — R51.9 HEAD ACHE: ICD-10-CM

## 2021-07-09 PROCEDURE — 74011250636 HC RX REV CODE- 250/636: Performed by: NEUROLOGICAL SURGERY

## 2021-07-09 PROCEDURE — A9575 INJ GADOTERATE MEGLUMI 0.1ML: HCPCS | Performed by: NEUROLOGICAL SURGERY

## 2021-07-09 PROCEDURE — 70553 MRI BRAIN STEM W/O & W/DYE: CPT

## 2021-07-09 RX ORDER — GADOTERATE MEGLUMINE 376.9 MG/ML
14 INJECTION INTRAVENOUS
Status: COMPLETED | OUTPATIENT
Start: 2021-07-09 | End: 2021-07-09

## 2021-07-09 RX ADMIN — GADOTERATE MEGLUMINE 14 ML: 376.9 INJECTION INTRAVENOUS at 12:12

## 2021-07-26 ENCOUNTER — OFFICE VISIT (OUTPATIENT)
Dept: INTERNAL MEDICINE CLINIC | Age: 66
End: 2021-07-26
Payer: COMMERCIAL

## 2021-07-26 VITALS
OXYGEN SATURATION: 98 % | BODY MASS INDEX: 28.21 KG/M2 | HEIGHT: 61 IN | HEART RATE: 75 BPM | WEIGHT: 149.4 LBS | RESPIRATION RATE: 16 BRPM | DIASTOLIC BLOOD PRESSURE: 75 MMHG | SYSTOLIC BLOOD PRESSURE: 127 MMHG | TEMPERATURE: 97.3 F

## 2021-07-26 DIAGNOSIS — F32.1 MODERATE MAJOR DEPRESSION (HCC): ICD-10-CM

## 2021-07-26 DIAGNOSIS — B18.2 CHRONIC HEPATITIS C WITHOUT HEPATIC COMA (HCC): ICD-10-CM

## 2021-07-26 DIAGNOSIS — D32.9 MENINGIOMA (HCC): ICD-10-CM

## 2021-07-26 DIAGNOSIS — N30.00 ACUTE CYSTITIS WITHOUT HEMATURIA: Primary | ICD-10-CM

## 2021-07-26 DIAGNOSIS — R10.9 FLANK PAIN: ICD-10-CM

## 2021-07-26 LAB
APPEARANCE UR: CLEAR
BACTERIA URNS QL MICRO: NEGATIVE /HPF
BILIRUB UR QL STRIP: NEGATIVE
BILIRUB UR QL: NEGATIVE
CAOX CRY URNS QL MICRO: ABNORMAL
COLOR UR: ABNORMAL
EPITH CASTS URNS QL MICRO: ABNORMAL /LPF
GLUCOSE UR STRIP.AUTO-MCNC: NEGATIVE MG/DL
GLUCOSE UR-MCNC: NEGATIVE MG/DL
HGB UR QL STRIP: NEGATIVE
KETONES P FAST UR STRIP-MCNC: NEGATIVE MG/DL
KETONES UR QL STRIP.AUTO: ABNORMAL MG/DL
LEUKOCYTE ESTERASE UR QL STRIP.AUTO: NEGATIVE
MUCOUS THREADS URNS QL MICRO: ABNORMAL /LPF
NITRITE UR QL STRIP.AUTO: NEGATIVE
PH UR STRIP: 5 [PH] (ref 5–8)
PH UR STRIP: 6 [PH] (ref 4.6–8)
PROT UR QL STRIP: NEGATIVE
PROT UR STRIP-MCNC: NEGATIVE MG/DL
RBC #/AREA URNS HPF: ABNORMAL /HPF (ref 0–5)
SP GR UR REFRACTOMETRY: 1.02 (ref 1–1.03)
SP GR UR STRIP: 1.03 (ref 1–1.03)
UA UROBILINOGEN AMB POC: NORMAL (ref 0.2–1)
URINALYSIS CLARITY POC: CLEAR
URINALYSIS COLOR POC: YELLOW
URINE BLOOD POC: NEGATIVE
URINE LEUKOCYTES POC: NEGATIVE
URINE NITRITES POC: NEGATIVE
UROBILINOGEN UR QL STRIP.AUTO: 0.2 EU/DL (ref 0.2–1)
WBC URNS QL MICRO: ABNORMAL /HPF (ref 0–4)

## 2021-07-26 PROCEDURE — 81003 URINALYSIS AUTO W/O SCOPE: CPT | Performed by: INTERNAL MEDICINE

## 2021-07-26 PROCEDURE — 99213 OFFICE O/P EST LOW 20 MIN: CPT | Performed by: INTERNAL MEDICINE

## 2021-07-26 RX ORDER — ATORVASTATIN CALCIUM 20 MG/1
20 TABLET, FILM COATED ORAL DAILY
Qty: 90 TABLET | Refills: 2 | Status: SHIPPED | OUTPATIENT
Start: 2021-07-26 | End: 2022-05-22

## 2021-07-26 NOTE — PROGRESS NOTES
Chief Complaint   Patient presents with    Flank Pain     mid back pain        1. Have you been to the ER, urgent care clinic since your last visit? Hospitalized since your last visit? No    2. Have you seen or consulted any other health care providers outside of the [address] [place] [address] since your last visit? Include any pap smears or colon screening.  No

## 2021-07-27 NOTE — PROGRESS NOTES
HPI:  Ibrahima Rodriguez is a 63y.o. year old female who is here for a week history of increased urine frequency as well as mild dysuria. Some right flank pain as well. Increase in pain with movement and some deep breath. No cough or wheeze. No SOB. No fever or chills. Bowels are normal. Had an MRI of the brain recently for follow up. Some left sided headache off and on. Has an appt with neurosurgery today. Past Medical History:   Diagnosis Date    Anxiety     CAD (coronary artery disease)     [facility]erebral [facility] occlusion with [facility] infarction ([facility] [address]     Colon polyps     Depression     GERD (gastroesophageal reflux disease)     Hemorrhoids     Hepatitis C     genotype 1    History of kidney stones     Hx of amaurosis fugax     right eye, 2014    Liver disease 2011    [place] 2011    Meningioma [facility]) 2014    SURGERY TO BE SCHEDULED    Migraines     Osteoporosis 2008    Psychiatric disorder     ANXIETY AND DEPRESSION    PVD (peripheral vascular disease) ([facility] [address] 2/2014    W/STENT , DR Kasey Ballesteros    Seizures ([facility] [address] 2013    ? SEIZURE, CONFUSION, TROUBLE WITH SPEECH       Past Surgical History:   Procedure Laterality Date    HX CHOLECYSTECTOMY  2001    HX COLONOSCOPY  10/27/2015    HX CRANIOTOMY  6/25/14    meningioma r medial sphenoid wing    HX GI  2002    gall bladder removal    HX GYN  1984    Tubal pregnancy    HX HYSTERECTOMY      HX OTHER SURGICAL  2/27/2014    Dr. Chente Funes  2/2014    ABD AORTIC STENT    VASCULAR SURGERY PROCEDURE UNLIST Right 6/10/14    CAROTID        Prior to Admission medications    Medication Sig Start Date End Date Taking? Authorizing Provider   atorvastatin (LIPITOR) 20 mg tablet Take 1 Tablet by mouth daily. 7/26/21  Yes Dashawn Dykes MD   denosumab (Prolia) 60 mg/mL injection 60 mg by SubCUTAneous route. Yes Provider, Historical   esomeprazole (NEXIUM) 40 mg capsule Take 1 Capsule by mouth daily. 5/28/21  Yes Lorin Abel MD   albuterol (PROVENTIL HFA, VENTOLIN HFA, PROAIR HFA) 90 mcg/actuation inhaler Take 2 Puffs by inhalation every four (4) hours as needed for Wheezing. Patient taking differently: Take 2 Puffs by inhalation every four (4) hours as needed for Wheezing. 2/19/20  Yes Lorin Abel MD   citalopram (CELEXA) 10 mg tablet  9/16/18  Yes Provider, Historical   dextroamphetamine-amphetamine (ADDERALL) 30 mg tablet Take 15 mg by mouth two (2) times a day. 8/10/17  Yes Provider, Historical   aspirin delayed-release 81 mg tablet Take 81 mg by mouth nightly. Yes Provider, Historical   cholecalciferol, vitamin D3, (VITAMIN D3) 2,000 unit Tab Take 2,000 Units by mouth daily. Yes Provider, Historical   clonazePAM (KLONOPIN) 0.5 mg tablet Take 0.5 mg by mouth nightly. Yes Provider, Historical   nitrofurantoin, macrocrystal-monohydrate, (MACROBID) 100 mg capsule Take 1 Capsule by mouth two (2) times a day. 6/11/21 7/26/21  Lurdes Olvera III, MD   atorvastatin (LIPITOR) 10 mg tablet TAKE 1 TABLET NIGHTLY 5/28/21 7/26/21  Lurdes Olvera III, MD   ondansetron (ZOFRAN ODT) 4 mg disintegrating tablet Take 1 Tab by mouth every eight (8) hours as needed for Nausea (with headache).  12/16/19 7/26/21  Duarte Casey DO       Social History     Socioeconomic History    Marital status:      Spouse name: Not on file    Number of children: Not on file    Years of education: Not on file    Highest education level: Not on file   Occupational History    Not on file   Tobacco Use    Smoking status: Current Every Day Smoker     Packs/day: 0.50     Years: 43.00     Pack years: 21.50     Types: Cigarettes    Smokeless tobacco: Never Used    Tobacco comment: joined 2001 [facility] for [employer] Vaping Use: Never used   Substance and Sexual Activity    Alcohol use: No     Alcohol/week: 0.0 standard drinks    Drug use: No    Sexual activity: Yes     Partners: Male Birth control/protection: Surgical, None   Other Topics Concern    Not on file   Social History Narrative    Not on file     Social Determinants of Health     Financial Resource Strain:     Difficulty of Paying Living Expenses:    Food Insecurity:     Worried About Running Out of Food in the Last Year:     [address] in the Last Year:    Transportation Needs:     Lack of Transportation (Medical):  Lack of Transportation (Non-Medical):    Physical Activity:     Days of Exercise per Week:     Minutes of Exercise per Session:    Stress:     Feeling of Stress :    Social Connections:     Frequency of Communication with Friends and Family:     Frequency of Social Gatherings with Friends and Family:     Attends Nondenominational Services:     Active Member of Clubs or Organizations:     Attends Club or Organization Meetings:     Marital Status:    Intimate Partner Violence:     Fear of Current or Ex-Partner:     Emotionally Abused:     Physically Abused:     Sexually Abused:           ROS  Per HPI    Visit Vitals  /75 (BP 1 Location: Left upper arm, BP Patient Position: Sitting, BP Cuff Size: Adult)   Pulse 75   Temp 97.3 °F (36.3 °C) (Temporal)   Resp 16   Ht 5' 1\" (1.549 m)   Wt 149 lb 6.4 oz (67.8 kg)   SpO2 98%   BMI 28.23 kg/m²         Physical Exam   Physical Examination: General appearance - alert, well appearing, and in no distress  Chest - clear to auscultation, no wheezes, rales or rhonchi, symmetric air entry. There is some tenderness across the right lower rib cage edge. Heart - normal rate, regular rhythm, normal S1, S2, no murmurs, rubs, clicks or gallops  Abdomen -active bowel sounds. Mild diffuse tenderness. No rebound or guarding. No masses.   Neurological - alert, oriented, normal speech, no focal findings or movement disorder noted  Musculoskeletal - no joint tenderness, deformity or swelling  Extremities - peripheral pulses normal, no pedal edema, no clubbing or cyanosis  Results for orders placed or performed in visit on 07/26/21   URINALYSIS W/MICROSCOPIC   Result Value Ref Range    Color YELLOW/STRAW      Appearance CLEAR CLEAR      Specific gravity 1.025 1.003 - 1.030      pH (UA) 5.0 5.0 - 8.0      Protein Negative Negative mg/dL    Glucose Negative Negative mg/dL    Ketone TRACE (A) Negative mg/dL    Bilirubin Negative Negative      Blood Negative Negative      Urobilinogen 0.2 0.2 - 1.0 EU/dL    Nitrites Negative Negative      Leukocyte Esterase Negative Negative      WBC 0-4 0 - 4 /hpf    RBC 0-5 0 - 5 /hpf    Epithelial cells FEW FEW /lpf    Bacteria Negative Negative /hpf    Mucus 1+ (A) Negative /lpf    CA Oxalate crystals FEW (A) Negative     AMB POC URINALYSIS DIP STICK AUTO W/O MICRO   Result Value Ref Range    Color (UA POC) Yellow     Clarity (UA POC) Clear     Glucose (UA POC) Negative Negative    Bilirubin (UA POC) Negative Negative    Ketones (UA POC) Negative Negative    Specific gravity (UA POC) 1.030 1.001 - 1.035    Blood (UA POC) Negative Negative    pH (UA POC) 6 4.6 - 8.0    Protein (UA POC) Negative Negative    Urobilinogen (UA POC) 0.2 mg/dL 0.2 - 1    Nitrites (UA POC) Negative Negative    Leukocyte esterase (UA POC) Negative Negative         Assessment/Plan:  Diagnoses and all orders for this visit:    1. Acute cystitis without hematuria not confirmed on urinalysis. We will send away for microscopic exam and culture. Treat if needed. -     URINALYSIS W/MICROSCOPIC; Future  -     CULTURE, URINE; Future  -     AMB POC URINALYSIS DIP STICK AUTO W/O MICRO    2. Moderate major depression (HCC)-Per psychiatry. 3. Meningioma (HCC)-we will follow-up with neurosurgery today. 4. Chronic hepatitis C without hepatic coma (HCC)-recent labs normal.    5. Flank pain-? Related to rib pain versus other etiology. If urinalysis reveals microscopic hematuria, will obtain CT scan to rule out stones.     Other orders  -     atorvastatin (LIPITOR) 20 mg tablet; Take 1 Tablet by mouth daily. Advised her to call back or return to office if symptoms worsen/change/persist.  Discussed expected course/resolution/complications of diagnosis in detail with patient. Medication risks/benefits/costs/interactions/alternatives discussed with patient. She was given an after visit summary which includes diagnoses, current medications, & vitals. She expressed understanding with the diagnosis and plan.

## 2021-07-28 LAB
BACTERIA SPEC CULT: NORMAL
SERVICE CMNT-IMP: NORMAL

## 2021-08-30 DIAGNOSIS — K21.9 GASTROESOPHAGEAL REFLUX DISEASE: ICD-10-CM

## 2021-08-30 RX ORDER — ESOMEPRAZOLE MAGNESIUM 40 MG/1
CAPSULE, DELAYED RELEASE ORAL
Qty: 90 CAPSULE | Refills: 0 | Status: SHIPPED | OUTPATIENT
Start: 2021-08-30 | End: 2021-09-09 | Stop reason: SDUPTHER

## 2021-09-09 DIAGNOSIS — K21.9 GASTROESOPHAGEAL REFLUX DISEASE: ICD-10-CM

## 2021-09-09 RX ORDER — ESOMEPRAZOLE MAGNESIUM 40 MG/1
CAPSULE, DELAYED RELEASE ORAL
Qty: 90 CAPSULE | Refills: 0 | Status: SHIPPED | OUTPATIENT
Start: 2021-09-09 | End: 2021-11-13 | Stop reason: SDUPTHER

## 2021-10-13 ENCOUNTER — OFFICE VISIT (OUTPATIENT)
Dept: INTERNAL MEDICINE CLINIC | Age: 66
End: 2021-10-13
Payer: COMMERCIAL

## 2021-10-13 VITALS
RESPIRATION RATE: 18 BRPM | WEIGHT: 150.2 LBS | OXYGEN SATURATION: 98 % | SYSTOLIC BLOOD PRESSURE: 121 MMHG | HEIGHT: 61 IN | BODY MASS INDEX: 28.36 KG/M2 | TEMPERATURE: 97.1 F | HEART RATE: 85 BPM | DIASTOLIC BLOOD PRESSURE: 75 MMHG

## 2021-10-13 DIAGNOSIS — Z12.31 SCREENING MAMMOGRAM FOR BREAST CANCER: ICD-10-CM

## 2021-10-13 DIAGNOSIS — I65.21 OBSTRUCTION OF RIGHT CAROTID ARTERY: ICD-10-CM

## 2021-10-13 DIAGNOSIS — Z87.891 PERSONAL HISTORY OF TOBACCO USE, PRESENTING HAZARDS TO HEALTH: ICD-10-CM

## 2021-10-13 DIAGNOSIS — Z00.00 WELL WOMAN EXAM (NO GYNECOLOGICAL EXAM): ICD-10-CM

## 2021-10-13 DIAGNOSIS — B18.2 CHRONIC HEPATITIS C WITHOUT HEPATIC COMA (HCC): Primary | ICD-10-CM

## 2021-10-13 DIAGNOSIS — K21.9 GASTROESOPHAGEAL REFLUX DISEASE, UNSPECIFIED WHETHER ESOPHAGITIS PRESENT: ICD-10-CM

## 2021-10-13 DIAGNOSIS — Z23 ENCOUNTER FOR IMMUNIZATION: ICD-10-CM

## 2021-10-13 DIAGNOSIS — F32.1 MODERATE MAJOR DEPRESSION (HCC): ICD-10-CM

## 2021-10-13 DIAGNOSIS — D32.9 MENINGIOMA (HCC): ICD-10-CM

## 2021-10-13 DIAGNOSIS — Z87.891 SMOKING HISTORY: ICD-10-CM

## 2021-10-13 DIAGNOSIS — E78.2 MIXED HYPERLIPIDEMIA: ICD-10-CM

## 2021-10-13 PROCEDURE — G0296 VISIT TO DETERM LDCT ELIG: HCPCS | Performed by: INTERNAL MEDICINE

## 2021-10-13 PROCEDURE — 90686 IIV4 VACC NO PRSV 0.5 ML IM: CPT | Performed by: INTERNAL MEDICINE

## 2021-10-13 PROCEDURE — 90471 IMMUNIZATION ADMIN: CPT | Performed by: INTERNAL MEDICINE

## 2021-10-13 NOTE — PROGRESS NOTES
Subjective:   61 y.o. female for Well Woman Check. Her gyne and breast care is done elsewhere by her Ob-Gyne physician. 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 90 Tablet 2    denosumab (Prolia) 60 mg/mL injection 60 mg by SubCUTAneous route.  albuterol (PROVENTIL HFA, VENTOLIN HFA, PROAIR HFA) 90 mcg/actuation inhaler Take 2 Puffs by inhalation every four (4) hours as needed for Wheezing. (Patient taking differently: Take 2 Puffs by inhalation every four (4) hours as needed for Wheezing.) 1 Inhaler 1    citalopram (CELEXA) 10 mg tablet       dextroamphetamine-amphetamine (ADDERALL) 30 mg tablet Take 15 mg by mouth two (2) times a day.  0    aspirin delayed-release 81 mg tablet Take 81 mg by mouth nightly.  cholecalciferol, vitamin D3, (VITAMIN D3) 2,000 unit Tab Take 2,000 Units by mouth daily.  clonazePAM (KLONOPIN) 0.5 mg tablet Take 0.5 mg by mouth nightly.        Allergies   Allergen Reactions    Tetracycline Other (comments)     GI upset     Past Medical History:   Diagnosis Date    Anxiety     CAD (coronary artery disease)     [facility]bral [facility] occlusion [facility]     Colon polyps     Depression     GERD (gastroesophageal reflux disease)     Hemorrhoids     Hepatitis C     genotype 1    History of kidney stones     Hx of amaurosis fugax     right eye, 2014  Liver disease 2011    hep c, [place] 2011    Meningioma [facility]) 2014    SURGERY TO BE SCHEDULED    Migraines     Osteoporosis 2008    Psychiatric disorder     ANXIETY AND DEPRESSION    PVD (peripheral vascular disease) ([facility] 2/2014    W/STENT , DR Tsering Ferreira    Seizures ([facility] 2013    ?  SEIZURE, CONFUSION, TROUBLE WITH SPEECH     Past Surgical History:   Procedure Laterality Date    HX CHOLECYSTECTOMY  2001    HX COLONOSCOPY  10/27/2015    HX CRANIOTOMY  6/25/14    meningioma r medial sphenoid wing    HX GI  2002    gall bladder removal    HX GYN  1984    Tubal pregnancy    HX HYSTERECTOMY      HX OTHER SURGICAL  2/27/2014    Dr. Vanita Emanuel      [facility] UNLIST  2/2014    ABD AORTIC STENT    VASCULAR SURGERY PROCEDURE UNLIST Right 6/10/14    CAROTID      Family History   Problem Relation Age of Onset    Heart Disease Mother         Heart attack and triple bypass    Anxiety Mother     Cancer Mother         Lung    Heart Surgery Mother         CABG X3 VESSEL    Alcohol abuse Mother         Quit Drinking 1986    Hypertension Mother         on medication    Lung Disease Mother         COPD    Anxiety Sister     Alcohol abuse Father         Never stoped    Asthma Father         Had since a child    Cancer Sister         Vaginal    Asthma Sister     Heart Disease Sister         mild heart attach 2018    Hypertension Sister         on medication    Lung Disease Sister         COPD    Diabetes Sister     Heart Disease Maternal Grandmother         heart aneurysm 46 of age 56    Heart Disease Paternal Grandmother         several heart attacks    Hypertension Paternal Grandmother         on medication    Stroke Paternal Grandmother         Multiple strokes in her 66's    Arthritis-osteo Paternal Grandmother         arthritis pain in hands    Heart Disease Paternal Grandfather         major heart attack cause of death    Lung Disease Maternal Grandfather COPD    Heart Disease Maternal Grandfather         cardiac arrest due to Cor pulmonale, Emphysema and COPD    Anesth Problems Neg Hx         Lab Results   Component Value Date/Time    WBC 9.5 06/11/2021 12:28 PM    HGB 13.7 06/11/2021 12:28 PM    Hemoglobin (POC) 13.9 05/13/2011 11:36 AM    HCT 41.2 06/11/2021 12:28 PM    Hematocrit (POC) 41 05/13/2011 11:36 AM    PLATELET 768 47/79/8467 12:28 PM    MCV 91 06/11/2021 12:28 PM     Lab Results   Component Value Date/Time    Cholesterol, total 149 06/11/2021 12:28 PM    HDL Cholesterol 41 06/11/2021 12:28 PM    LDL, calculated 91 06/11/2021 12:28 PM    LDL, calculated 100 (H) 12/12/2019 12:23 PM    Triglyceride 88 06/11/2021 12:28 PM    CHOL/HDL Ratio 3.8 02/04/2015 03:25 AM     Lab Results   Component Value Date/Time    ALT (SGPT) 6 06/11/2021 12:28 PM    Alk.  phosphatase 138 (H) 06/11/2021 12:28 PM    Bilirubin, direct <0.1 07/21/2016 11:27 PM    Bilirubin, total <0.2 06/11/2021 12:28 PM    Albumin 3.9 06/11/2021 12:28 PM    Protein, total 6.7 06/11/2021 12:28 PM    INR 1.1 02/04/2015 03:25 AM    Prothrombin time 10.6 02/04/2015 03:25 AM    PLATELET 847 54/64/0522 12:28 PM     Lab Results   Component Value Date/Time    GFR est non-AA 88 06/11/2021 12:28 PM    GFRNA, POC >60 05/13/2011 11:36 AM    GFR est  06/11/2021 12:28 PM    GFRAA, POC >60 05/13/2011 11:36 AM    Creatinine 0.73 06/11/2021 12:28 PM    Creatinine (POC) 0.6 05/13/2011 11:36 AM    BUN 10 06/11/2021 12:28 PM    BUN (POC) 8 (L) 05/13/2011 11:36 AM    Sodium 139 06/11/2021 12:28 PM    Sodium (POC) 140 05/13/2011 11:36 AM    Potassium 4.7 06/11/2021 12:28 PM    Potassium (POC) 4.3 05/13/2011 11:36 AM    Chloride 103 06/11/2021 12:28 PM    Chloride (POC) 105 05/13/2011 11:36 AM    CO2 23 06/11/2021 12:28 PM     Lab Results   Component Value Date/Time    TSH 3.350 06/11/2021 12:28 PM    TSH 3.370 05/20/2013 08:36 AM    T3 Uptake 21 (L) 09/16/2011 12:00 AM    T4, Total 16.6 (H) 09/16/2011 12:00 AM      Lab Results   Component Value Date/Time    Glucose 99 06/11/2021 12:28 PM    Glucose (POC) 94 05/13/2011 11:36 AM         Specific concerns today: Some ongoing issues with back pain. She has recently seen the vascular surgeon. Her recent MRI for follow-up of meningioma did show a punctate infarct. She wondered about that and what symptoms should be associated with that as she has been asymptomatic. Ivonne Mcclellan Review of Systems  A comprehensive review of systems was negative except for that written in the HPI. Objective:   Blood pressure 121/75, pulse 85, temperature 97.1 °F (36.2 °C), temperature source Temporal, resp. rate 18, height 5' 1\" (1.549 m), weight 150 lb 3.2 oz (68.1 kg), SpO2 98 %. Physical Examination:   General appearance - alert, well appearing, and in no distress  Ears - bilateral TM's and external ear canals normal  Nose - normal and patent, no erythema, discharge or polyps  Mouth - mucous membranes moist, pharynx normal without lesions  Neck - supple, no significant adenopathy  Lymphatics - no palpable lymphadenopathy, no hepatosplenomegaly  Chest - clear to auscultation, no wheezes, rales or rhonchi, symmetric air entry  Heart - normal rate, regular rhythm, normal S1, S2, no murmurs, rubs, clicks or gallops  Abdomen - soft, nontender, nondistended, no masses or organomegaly  Back exam - limited range of motion, pain with motion noted during exam, normal reflexes and strength bilateral lower extremities  Neurological - alert, oriented, normal speech, no focal findings or movement disorder noted  Musculoskeletal - no joint tenderness, deformity or swelling  Extremities - peripheral pulses normal, no pedal edema, no clubbing or cyanosis     Assessment/Plan:     stop smoking (advice and handout given), follow low fat diet, follow low salt diet  Diagnoses and all orders for this visit:    1. Chronic hepatitis C without hepatic coma (HCC)-in remission.   Recent labs normal.    2. Mixed hyperlipidemia-LDL at goal on her most recent labs. 3. Obstruction of right carotid artery-stable and follow-up with vascular surgery. 4. Meningioma (HCC)-no recurrence. 5. Moderate major depression (HCC)-Per psychiatry. 6. Gastroesophageal reflux disease, unspecified whether esophagitis present-stable. 7. Screening mammogram for breast cancer  -     [facility] MAMMO BI SCREENING INCL CAD; Future    8. Well woman exam (no gynecological exam)  Comments:  [V70.0]    9. Encounter for immunization  -     INFLUENZA VIRUS VAC QUAD,SPLIT,PRESV FREE SYRINGE IM    10. Smoking history-strongly encouraged her to discontinue smoking and made her aware that this is a significant risk for ongoing vascular disease and stroke. -     CT LOW DOSE LUNG CANCER SCREENING; Future    11. Personal history of tobacco use, presenting hazards to health  -     CT LOW DOSE LUNG CANCER SCREENING; Future      Discussed with patient current guidelines for screening for lung cancer. Current recommendations are to obtain yearly screening LDCT yearly for age 46-80, or until smoke free for 15 years. Patient has 43 pack year history of cigarette smoking and currently 1/2 ppd. Discussed with patient risks and benefits of screening, including over-diagnosis, false positive rate, and total radiation exposure. Patient currently exhibits no signs or symptoms suggestive of lung cancer. Discussed with patient importance of compliance with yearly annual lung cancer screenings and willingness to undergo diagnosis and treatment if screening scan is positive. In addition, patient was counseled regarding (remaining smoke free/total smoking cessation).

## 2021-10-13 NOTE — PROGRESS NOTES
Chief Complaint   Patient presents with    Complete Physical       1. Have you been to the ER, urgent care clinic since your last visit? Hospitalized since your last visit? No    2. Have you seen or consulted any other health care providers outside of the [address] [place] [address] since your last visit? Include any pap smears or colon screening.  No

## 2021-10-13 NOTE — PROGRESS NOTES
Lo Hand  is a 61 y.o.  female  who present for routine immunizations. Prior to vaccine administration: Consent was obtained. Risks and adverse reactions were discussed. The patient was provided the VIS and they were given an opportunity to ask questions; all questions were addressed. She  denies any symptoms, reactions or allergies that would exclude them from being immunized today. There were no adverse reactions observed post vaccination. Patient was advised to seek medical or call the office with any questions or concerns post vaccination. Patient verbalized understanding.   Donna Koo

## 2021-11-09 ENCOUNTER — HOSPITAL ENCOUNTER (OUTPATIENT)
Dept: MAMMOGRAPHY | Age: 66
Discharge: HOME | End: 2021-11-09
Attending: INTERNAL MEDICINE
Payer: COMMERCIAL

## 2021-11-09 ENCOUNTER — HOSPITAL ENCOUNTER (OUTPATIENT)
Dept: CT IMAGING | Age: 66
Discharge: HOME | End: 2021-11-09
Attending: INTERNAL MEDICINE
Payer: COMMERCIAL

## 2021-11-09 VITALS — WEIGHT: 150 LBS | HEIGHT: 61 IN | BODY MASS INDEX: 28.32 KG/M2

## 2021-11-09 DIAGNOSIS — Z87.891 PERSONAL HISTORY OF TOBACCO USE, PRESENTING HAZARDS TO HEALTH: ICD-10-CM

## 2021-11-09 DIAGNOSIS — Z12.31 SCREENING MAMMOGRAM FOR BREAST CANCER: ICD-10-CM

## 2021-11-09 DIAGNOSIS — Z87.891 SMOKING HISTORY: ICD-10-CM

## 2021-11-09 PROCEDURE — 77067 SCR MAMMO BI INCL CAD: CPT

## 2021-11-09 PROCEDURE — 71271 CT THORAX LUNG CANCER SCR C-: CPT

## 2021-11-13 DIAGNOSIS — K21.9 GASTROESOPHAGEAL REFLUX DISEASE: ICD-10-CM

## 2021-11-13 RX ORDER — ESOMEPRAZOLE MAGNESIUM 40 MG/1
CAPSULE, DELAYED RELEASE ORAL
Qty: 90 CAPSULE | Refills: 0 | Status: SHIPPED | OUTPATIENT
Start: 2021-11-13 | End: 2022-01-19

## 2022-01-19 DIAGNOSIS — K21.9 GASTROESOPHAGEAL REFLUX DISEASE: ICD-10-CM

## 2022-01-19 RX ORDER — ESOMEPRAZOLE MAGNESIUM 40 MG/1
CAPSULE, DELAYED RELEASE ORAL
Qty: 90 CAPSULE | Refills: 0 | Status: SHIPPED | OUTPATIENT
Start: 2022-01-19 | End: 2022-05-22

## 2022-03-18 PROBLEM — F32.1 MODERATE MAJOR DEPRESSION (HCC): Status: ACTIVE | Noted: 2018-10-05

## 2022-04-26 ENCOUNTER — OFFICE VISIT (OUTPATIENT)
Dept: INTERNAL MEDICINE CLINIC | Age: 67
End: 2022-04-26
Payer: COMMERCIAL

## 2022-04-26 VITALS
HEIGHT: 61 IN | OXYGEN SATURATION: 100 % | HEART RATE: 81 BPM | SYSTOLIC BLOOD PRESSURE: 138 MMHG | RESPIRATION RATE: 14 BRPM | WEIGHT: 152 LBS | DIASTOLIC BLOOD PRESSURE: 72 MMHG | TEMPERATURE: 97.8 F | BODY MASS INDEX: 28.7 KG/M2

## 2022-04-26 DIAGNOSIS — B18.2 CHRONIC HEPATITIS C WITHOUT HEPATIC COMA (HCC): ICD-10-CM

## 2022-04-26 DIAGNOSIS — R39.9 UTI SYMPTOMS: Primary | ICD-10-CM

## 2022-04-26 DIAGNOSIS — F32.1 MODERATE MAJOR DEPRESSION (HCC): ICD-10-CM

## 2022-04-26 DIAGNOSIS — D32.9 MENINGIOMA (HCC): ICD-10-CM

## 2022-04-26 LAB
BILIRUB UR QL STRIP: NEGATIVE
GLUCOSE UR-MCNC: NEGATIVE MG/DL
KETONES P FAST UR STRIP-MCNC: NEGATIVE MG/DL
PH UR STRIP: 6 [PH] (ref 4.6–8)
PROT UR QL STRIP: NEGATIVE
SP GR UR STRIP: 1.02 (ref 1–1.03)
UA UROBILINOGEN AMB POC: NORMAL (ref 0.2–1)
URINALYSIS CLARITY POC: CLEAR
URINALYSIS COLOR POC: NORMAL
URINE BLOOD POC: NEGATIVE
URINE LEUKOCYTES POC: NORMAL
URINE NITRITES POC: POSITIVE

## 2022-04-26 PROCEDURE — 99213 OFFICE O/P EST LOW 20 MIN: CPT | Performed by: INTERNAL MEDICINE

## 2022-04-26 PROCEDURE — 81003 URINALYSIS AUTO W/O SCOPE: CPT | Performed by: INTERNAL MEDICINE

## 2022-04-26 RX ORDER — ALBUTEROL SULFATE 90 UG/1
2 AEROSOL, METERED RESPIRATORY (INHALATION)
Qty: 1 EACH | Refills: 5 | Status: SHIPPED | OUTPATIENT
Start: 2022-04-26

## 2022-04-26 RX ORDER — SULFAMETHOXAZOLE AND TRIMETHOPRIM 800; 160 MG/1; MG/1
1 TABLET ORAL 2 TIMES DAILY
Qty: 14 TABLET | Refills: 0 | Status: SHIPPED | OUTPATIENT
Start: 2022-04-26 | End: 2022-10-27 | Stop reason: ALTCHOICE

## 2022-04-26 RX ORDER — ESTRADIOL 0.1 MG/G
2 CREAM VAGINAL 2 TIMES WEEKLY
Qty: 42.5 G | Refills: 1 | Status: SHIPPED | OUTPATIENT
Start: 2022-04-29 | End: 2022-10-11

## 2022-04-26 RX ORDER — SULINDAC 200 MG/1
200 TABLET ORAL 2 TIMES DAILY
COMMUNITY
Start: 2022-04-20 | End: 2022-10-27 | Stop reason: ALTCHOICE

## 2022-04-26 NOTE — PROGRESS NOTES
HPI:  Amaris Kunz is a 61y.o. year old female who is here for a?  UTI. She has had 2-day history of dysuria associated with some blood on the tissue when she wipes. Ongoing issues with vaginal dryness. She has been trying to hydrate and empty her bladder is much as possible. No nausea or vomiting. No fevers or chills. Some increased wheeze associated with her allergies. Past Medical History:   Diagnosis Date    Anxiety     CAD (coronary artery disease)     [facility]     Colon polyps     Depression     GERD (gastroesophageal reflux disease)     Hemorrhoids     Hepatitis C     genotype 1    History of kidney stones     Hx of amaurosis fugax     right eye, 2014    Liver disease 2011    [place] c[place] 2011    Meningioma [facility]) 2014    SURGERY TO BE SCHEDULED    Migraines     Osteoporosis 2008    Psychiatric disorder     ANXIETY AND DEPRESSION    PVD (peripheral vascular disease) ([facility] 2/2014    W/STENT , DR Eros Marquez    Seizures ([facility] 2013    ? SEIZURE, CONFUSION, TROUBLE WITH SPEECH       Past Surgical History:   Procedure Laterality Date    HX CHOLECYSTECTOMY  2001    HX COLONOSCOPY  10/27/2015    HX CRANIOTOMY  6/25/14    meningioma r medial sphenoid wing    HX GI  2002    gall bladder removal    HX GYN  1984    Tubal pregnancy    HX HYSTERECTOMY      HX OTHER SURGICAL  2/27/2014    Dr. Smith Lunch  2/2014    ABD AORTIC STENT    VASCULAR SURGERY PROCEDURE UNLIST Right 6/10/14    CAROTID        Prior to Admission medications    Medication Sig Start Date End Date Taking? Authorizing Provider   sulindac (CLINORIL) 200 mg tablet Take 200 mg by mouth two (2) times a day. 4/20/22  Yes Provider, Historical   albuterol (PROVENTIL HFA, VENTOLIN HFA, PROAIR HFA) 90 mcg/actuation inhaler Take 2 Puffs by inhalation every four (4) hours as needed for Wheezing.  4/26/22  Yes Loren Anaya III, MD   estradioL (ESTRACE) 0.01 % (0.1 mg/gram) vaginal cream Insert 2 g into vagina two (2) times a week. 4/29/22  Yes Amanda Brown MD   trimethoprim-sulfamethoxazole (BACTRIM DS, SEPTRA DS) 160-800 mg per tablet Take 1 Tablet by mouth two (2) times a day. 4/26/22  Yes Amanda Brown MD   esomeprazole (NEXIUM) 40 mg capsule TAKE 1 CAPSULE DAILY 1/19/22  Yes Amanda Brown MD   atorvastatin (LIPITOR) 20 mg tablet Take 1 Tablet by mouth daily. 7/26/21  Yes Amanda Brown MD   denosumab (Prolia) 60 mg/mL injection 60 mg by SubCUTAneous route. Yes Provider, Historical   citalopram (CELEXA) 10 mg tablet  9/16/18  Yes Provider, Historical   dextroamphetamine-amphetamine (ADDERALL) 30 mg tablet Take 15 mg by mouth two (2) times a day. 8/10/17  Yes Provider, Historical   aspirin delayed-release 81 mg tablet Take 81 mg by mouth nightly. Yes Provider, Historical   cholecalciferol, vitamin D3, (VITAMIN D3) 2,000 unit Tab Take 2,000 Units by mouth daily. Yes Provider, Historical   clonazePAM (KLONOPIN) 0.5 mg tablet Take 0.5 mg by mouth nightly. Yes Provider, Historical   albuterol (PROVENTIL HFA, VENTOLIN HFA, PROAIR HFA) 90 mcg/actuation inhaler Take 2 Puffs by inhalation every four (4) hours as needed for Wheezing. Patient taking differently: Take 2 Puffs by inhalation every four (4) hours as needed for Wheezing.  2/19/20 4/26/22  Amanda Brown MD       Social History     Socioeconomic History    Marital status:      Spouse name: Not on file    Number of children: Not on file    Years of education: Not on file    Highest education level: Not on file   Occupational History    Not on file   Tobacco Use    Smoking status: Current Every Day Smoker     Packs/day: 0.50     Years: 43.00     Pack years: 21.50     Types: Cigarettes    Smokeless tobacco: Never Used    Tobacco comment: joined [employer] Vaping Use: Never used   Substance and Sexual Activity    Alcohol use: No     Alcohol/week: 0.0 standard drinks    Drug use: No    Sexual activity: Yes     Partners: Male     Birth control/protection: Surgical, None   Other Topics Concern    Not on file   Social History Narrative    Not on file     Social Determinants of Health     Financial Resource Strain:     Difficulty of Paying Living Expenses: Not on file   Food Insecurity:     Worried About Running Out of Food in the Last Year: Not on file    Anant of Food in the Last Year: Not on file   Transportation Needs:     Lack of Transportation (Medical): Not on file    Lack of Transportation (Non-Medical):  Not on file   Physical Activity:     Days of Exercise per Week: Not on file    Minutes of Exercise per Session: Not on file   Stress:     Feeling of Stress : Not on file   Social Connections:     Frequency of Communication with Friends and Family: Not on file    Frequency of Social Gatherings with Friends and Family: Not on file    Attends Spiritism Services: Not on file    Active Member of [address] [place] [address] or Organizations: Not on file    Attends Club or Organization Meetings: Not on file    Marital Status: Not on file   Intimate Partner Violence:     Fear of Current or Ex-Partner: Not on file    Emotionally Abused: Not on file    Physically Abused: Not on file    Sexually Abused: Not on file   Housing Stability:     Unable to Pay for Housing in the Last Year: Not on file    Number of [place] in the Last Year: Not on file    Unstable Housing in the Last Year: Not on file          ROS  Per HPI    Visit Vitals  BP (!) 146/78   Pulse 81   Temp 97.8 °F (36.6 °C) (Temporal)   Resp 14   Ht 5' 1\" (1.549 m)   Wt 152 lb (68.9 kg)   SpO2 100%   BMI 28.72 kg/m²         Physical Exam   Physical Examination: General appearance - alert, well appearing, and in no distress  Chest - clear to auscultation, no wheezes, rales or rhonchi, symmetric air entry  Heart - normal rate, regular rhythm, normal S1, S2, no murmurs, rubs, clicks or gallops  Abdomen - soft, nontender, nondistended, no masses or organomegaly  Back exam - full range of motion, no tenderness, palpable spasm or pain on motion    Results for orders placed or performed in visit on 04/26/22   AMB POC URINALYSIS DIP STICK AUTO W/O MICRO   Result Value Ref Range    Color (UA POC) Dark Yellow     Clarity (UA POC) Clear     Glucose (UA POC) Negative Negative    Bilirubin (UA POC) Negative Negative    Ketones (UA POC) Negative Negative    Specific gravity (UA POC) 1.025 1.001 - 1.035    Blood (UA POC) Negative Negative    pH (UA POC) 6.0 4.6 - 8.0    Protein (UA POC) Negative Negative    Urobilinogen (UA POC) 1 mg/dL 0.2 - 1    Nitrites (UA POC) Positive Negative    Leukocyte esterase (UA POC) Trace Negative       Assessment/Plan:  Diagnoses and all orders for this visit:    1. UTI symptoms-repeat culture today. Will treat with antibiotics. Trial of vaginal estrogens to see if it helps with symptoms and recurrence. -     CULTURE, URINE; Future  -     URINALYSIS W/MICROSCOPIC; Future  -     AMB POC URINALYSIS DIP STICK AUTO W/O MICRO    2. Moderate major depression (HCC)-stable and followed by psychiatry. 3. Meningioma (HCC)-no recurrence. 4. Chronic hepatitis C without hepatic coma (HCC)-in remission after treatment. 5.?  COPD-continue albuterol as needed and call if worsening. Other orders  -     albuterol (PROVENTIL HFA, VENTOLIN HFA, PROAIR HFA) 90 mcg/actuation inhaler; Take 2 Puffs by inhalation every four (4) hours as needed for Wheezing.  -     estradioL (ESTRACE) 0.01 % (0.1 mg/gram) vaginal cream; Insert 2 g into vagina two (2) times a week. -     trimethoprim-sulfamethoxazole (BACTRIM DS, SEPTRA DS) 160-800 mg per tablet; Take 1 Tablet by mouth two (2) times a day.               Advised her to call back or return to office if symptoms worsen/change/persist.  Discussed expected course/resolution/complications of diagnosis in detail with patient. Medication risks/benefits/costs/interactions/alternatives discussed with patient. She was given an after visit summary which includes diagnoses, current medications, & vitals. She expressed understanding with the diagnosis and plan.

## 2022-04-26 NOTE — PATIENT INSTRUCTIONS
Urinary Tract Infection (UTI) in Women: Care Instructions  Overview     A urinary tract infection, or UTI, is a general term for an infection anywhere between the kidneys and the urethra (where urine comes out). Most UTIs are bladder infections. They often cause pain or burning when you urinate. UTIs are caused by bacteria and can be cured with antibiotics. Be sure to complete your treatment so that the infection does not get worse. Follow-up care is a key part of your treatment and safety. Be sure to make and go to all appointments, and call your doctor if you are having problems. It's also a good idea to know your test results and keep a list of the medicines you take. How can you care for yourself at home? · Take your antibiotics as directed. Do not stop taking them just because you feel better. You need to take the full course of antibiotics. · Drink extra water and other fluids for the next day or two. This will help make the urine less concentrated and help wash out the bacteria that are causing the infection. (If you have kidney, heart, or liver disease and have to limit fluids, talk with your doctor before you increase the amount of fluids you drink.)  · Avoid drinks that are carbonated or have caffeine. They can irritate the bladder. · Urinate often. Try to empty your bladder each time. · To relieve pain, take a hot bath or lay a heating pad set on low over your lower belly or genital area. Never go to sleep with a heating pad in place. To prevent UTIs  · Drink plenty of water each day. This helps you urinate often, which clears bacteria from your system. (If you have kidney, heart, or liver disease and have to limit fluids, talk with your doctor before you increase the amount of fluids you drink.)  · Urinate when you need to. · If you are sexually active, urinate right after you have sex. · Change sanitary pads often.   · Avoid douches, bubble baths, feminine hygiene sprays, and other feminine hygiene products that have deodorants. · After going to the bathroom, wipe from front to back. When should you call for help? Call your doctor now or seek immediate medical care if:    · Symptoms such as fever, chills, nausea, or vomiting get worse or appear for the first time.     · You have new pain in your back just below your rib cage. This is called flank pain.     · There is new blood or pus in your urine.     · You have any problems with your antibiotic medicine. Watch closely for changes in your health, and be sure to contact your doctor if:    · You are not getting better after taking an antibiotic for 2 days.     · Your symptoms go away but then come back. Where can you learn more? Go to http://akira-francia.info/  Enter T667 in the search box to learn more about \"Urinary Tract Infection (UTI) in Women: Care Instructions. \"  Current as of: October 18, 2021               Content Version: 13.2  © 2006-2022 [employer]. Care instructions adapted under license by [employer] (which disclaims liability or warranty for this information). If you have questions about a medical condition or this instruction, always ask your healthcare professional. [place] [address] any warranty or liability for your use of this information.

## 2022-04-27 LAB
APPEARANCE UR: CLEAR
BACTERIA URNS QL MICRO: ABNORMAL /HPF
BILIRUB UR QL: NEGATIVE
COLOR UR: ABNORMAL
EPITH CASTS URNS QL MICRO: ABNORMAL /LPF
GLUCOSE UR STRIP.AUTO-MCNC: NEGATIVE MG/DL
HGB UR QL STRIP: NEGATIVE
KETONES UR QL STRIP.AUTO: NEGATIVE MG/DL
LEUKOCYTE ESTERASE UR QL STRIP.AUTO: ABNORMAL
NITRITE UR QL STRIP.AUTO: NEGATIVE
PH UR STRIP: 6 [PH] (ref 5–8)
PROT UR STRIP-MCNC: NEGATIVE MG/DL
RBC #/AREA URNS HPF: ABNORMAL /HPF (ref 0–5)
SP GR UR REFRACTOMETRY: 1.02 (ref 1–1.03)
UROBILINOGEN UR QL STRIP.AUTO: 1 EU/DL (ref 0.2–1)
WBC URNS QL MICRO: ABNORMAL /HPF (ref 0–4)

## 2022-04-29 LAB
BACTERIA SPEC CULT: ABNORMAL
CC UR VC: ABNORMAL
SERVICE CMNT-IMP: ABNORMAL

## 2022-05-22 DIAGNOSIS — K21.9 GASTROESOPHAGEAL REFLUX DISEASE: ICD-10-CM

## 2022-05-22 RX ORDER — ATORVASTATIN CALCIUM 20 MG/1
TABLET, FILM COATED ORAL
Qty: 90 TABLET | Refills: 2 | Status: SHIPPED | OUTPATIENT
Start: 2022-05-22

## 2022-05-22 RX ORDER — ESOMEPRAZOLE MAGNESIUM 40 MG/1
CAPSULE, DELAYED RELEASE ORAL
Qty: 90 CAPSULE | Refills: 0 | Status: SHIPPED | OUTPATIENT
Start: 2022-05-22 | End: 2022-07-06

## 2022-07-05 DIAGNOSIS — K21.9 GASTROESOPHAGEAL REFLUX DISEASE: ICD-10-CM

## 2022-07-06 RX ORDER — ESOMEPRAZOLE MAGNESIUM 40 MG/1
CAPSULE, DELAYED RELEASE ORAL
Qty: 90 CAPSULE | Refills: 0 | Status: SHIPPED | OUTPATIENT
Start: 2022-07-06 | End: 2022-10-09

## 2022-10-09 DIAGNOSIS — K21.9 GASTROESOPHAGEAL REFLUX DISEASE: ICD-10-CM

## 2022-10-09 RX ORDER — ESOMEPRAZOLE MAGNESIUM 40 MG/1
CAPSULE, DELAYED RELEASE ORAL
Qty: 90 CAPSULE | Refills: 0 | Status: SHIPPED | OUTPATIENT
Start: 2022-10-09

## 2022-10-11 RX ORDER — ESTRADIOL 0.1 MG/G
CREAM VAGINAL
Qty: 42.5 G | Refills: 1 | Status: SHIPPED | OUTPATIENT
Start: 2022-10-11

## 2022-10-27 ENCOUNTER — OFFICE VISIT (OUTPATIENT)
Dept: INTERNAL MEDICINE CLINIC | Age: 67
End: 2022-10-27
Payer: MEDICARE

## 2022-10-27 VITALS
OXYGEN SATURATION: 97 % | DIASTOLIC BLOOD PRESSURE: 74 MMHG | RESPIRATION RATE: 18 BRPM | TEMPERATURE: 98.2 F | SYSTOLIC BLOOD PRESSURE: 117 MMHG | HEIGHT: 61 IN | WEIGHT: 143 LBS | HEART RATE: 77 BPM | BODY MASS INDEX: 27 KG/M2

## 2022-10-27 DIAGNOSIS — M54.16 LUMBAR RADICULOPATHY: ICD-10-CM

## 2022-10-27 DIAGNOSIS — B18.2 CHRONIC HEPATITIS C WITHOUT HEPATIC COMA (HCC): ICD-10-CM

## 2022-10-27 DIAGNOSIS — D32.9 MENINGIOMA (HCC): ICD-10-CM

## 2022-10-27 DIAGNOSIS — Z00.00 WELCOME TO MEDICARE PREVENTIVE VISIT: Primary | ICD-10-CM

## 2022-10-27 DIAGNOSIS — F32.1 MODERATE MAJOR DEPRESSION (HCC): ICD-10-CM

## 2022-10-27 DIAGNOSIS — E78.2 MIXED HYPERLIPIDEMIA: ICD-10-CM

## 2022-10-27 DIAGNOSIS — Z87.891 PERSONAL HISTORY OF TOBACCO USE, PRESENTING HAZARDS TO HEALTH: ICD-10-CM

## 2022-10-27 DIAGNOSIS — Z23 ENCOUNTER FOR IMMUNIZATION: ICD-10-CM

## 2022-10-27 DIAGNOSIS — Z12.31 SCREENING MAMMOGRAM FOR BREAST CANCER: ICD-10-CM

## 2022-10-27 DIAGNOSIS — I65.21 OBSTRUCTION OF RIGHT CAROTID ARTERY: ICD-10-CM

## 2022-10-27 DIAGNOSIS — K21.9 GASTROESOPHAGEAL REFLUX DISEASE, UNSPECIFIED WHETHER ESOPHAGITIS PRESENT: ICD-10-CM

## 2022-10-27 DIAGNOSIS — I63.9 CEREBROVASCULAR ACCIDENT (CVA), UNSPECIFIED MECHANISM (HCC): ICD-10-CM

## 2022-10-27 PROCEDURE — G8417 CALC BMI ABV UP PARAM F/U: HCPCS | Performed by: INTERNAL MEDICINE

## 2022-10-27 PROCEDURE — G0403 EKG FOR INITIAL PREVENT EXAM: HCPCS | Performed by: INTERNAL MEDICINE

## 2022-10-27 PROCEDURE — 1090F PRES/ABSN URINE INCON ASSESS: CPT | Performed by: INTERNAL MEDICINE

## 2022-10-27 PROCEDURE — G9717 DOC PT DX DEP/BP F/U NT REQ: HCPCS | Performed by: INTERNAL MEDICINE

## 2022-10-27 PROCEDURE — G8536 NO DOC ELDER MAL SCRN: HCPCS | Performed by: INTERNAL MEDICINE

## 2022-10-27 PROCEDURE — G0402 INITIAL PREVENTIVE EXAM: HCPCS | Performed by: INTERNAL MEDICINE

## 2022-10-27 PROCEDURE — G8427 DOCREV CUR MEDS BY ELIG CLIN: HCPCS | Performed by: INTERNAL MEDICINE

## 2022-10-27 PROCEDURE — 90694 VACC AIIV4 NO PRSRV 0.5ML IM: CPT | Performed by: INTERNAL MEDICINE

## 2022-10-27 PROCEDURE — G0008 ADMIN INFLUENZA VIRUS VAC: HCPCS | Performed by: INTERNAL MEDICINE

## 2022-10-27 RX ORDER — GABAPENTIN 100 MG/1
100 CAPSULE ORAL
Qty: 90 CAPSULE | Refills: 0 | Status: SHIPPED | OUTPATIENT
Start: 2022-10-27

## 2022-10-27 NOTE — PROGRESS NOTES
This is a \"Welcome to [employer]"  Initial Preventive Physical Examination (IPPE) providing Personalized Prevention Plan Services (Performed in the first 12 months of enrollment)    I have reviewed the patient's medical history in detail and updated the computerized patient record. Also for a follow-up of her health issues. She has had a 1 week history of upper respiratory symptoms. She has clear nasal discharge with postnasal drip. No significant cough or wheeze. She has used an occasional dose of albuterol. No change in bowel or bladder habits otherwise. She is having a colonoscopy on Monday. She did have some bleeding with bowel movements recently but that was self-limited. She does continue to smoke. ROS - Per HPI    Physical Examination: General appearance - alert, well appearing, and in no distress  Mental status - alert, oriented to person, place, and time  Ears - bilateral TM's and external ear canals normal  Nose - normal and patent, no erythema, discharge or polyps  Mouth - mucous membranes moist, pharynx normal without lesions  Neck - supple, no significant adenopathy  Lymphatics - no palpable lymphadenopathy, no hepatosplenomegaly  Chest - clear to auscultation, no wheezes, rales or rhonchi, symmetric air entry  Heart - normal rate, regular rhythm, normal S1, S2, no murmurs, rubs, clicks or gallops  Abdomen - soft, nontender, nondistended, no masses or organomegaly  Neurological - alert, oriented, normal speech, no focal findings or movement disorder noted  Musculoskeletal - no joint tenderness, deformity or swelling  Extremities - peripheral pulses normal, no pedal edema, no clubbing or cyanosis      Assessment/Plan   Education and counseling provided:  Are appropriate based on today's review and evaluation  End-of-Life planning (with patient's consent)  Screening Mammography  Colorectal cancer screening tests  Diabetes screening test    1.  Welcome to Medicare preventive visit  -     AMB POC EKG ROUTINE W/ 12 LEADS, SCREEN ()  2. Mixed hyperlipidemia-LDL goal of 100. Check labs to be sure that is met. 3. 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 4. Meningioma (HCC)-Per neurosurgery. No recurrence  5. Moderate major depression (HCC)-Per psychiatry. 6. Cerebrovascular accident (CVA), unspecified mechanism (HCC)-continue aspirin and statin. 7. Chronic hepatitis C without hepatic coma (HCC)-discussed hepatitis a and B vaccines and she will get those at her pharmacy. 8. Gastroesophageal reflux disease, unspecified whether esophagitis present-stable on Nexium. 9. Personal history of tobacco use, presenting hazards to health  -     CT LOW DOSE LUNG CANCER SCREENING; Future       Depression Risk Screen     3 most recent PHQ Screens 10/27/2022   PHQ Not Done -   Little interest or pleasure in doing things Not at all   Feeling down, depressed, irritable, or hopeless Not at all   Total Score PHQ 2 0       Alcohol & Drug Abuse Risk Screen    Do you average more than 1 drink per night or more than 7 drinks a week:  No    On any one occasion in the past three months have you have had more than 3 drinks containing alcohol:  No          Functional Ability and Level of Safety    Diet: No special diet      Hearing: Hearing is good. Vision Screening:  Vision is good. No results found. Activities of Daily Living: The home contains: no safety equipment. Patient does total self care      Ambulation: with no difficulty      Exercise level: sedentary     Fall Risk Screen:  Fall Risk Assessment, last 12 mths 10/27/2022   Able to walk? Yes   Fall in past 12 months? 0   Do you feel unsteady? 0   Are you worried about falling 0      Abuse Screen:  Patient is not abused       Screening EKG   EKG order placed: Yes    End of Life Planning   Advanced care planning directives were discussed with the patient and /or family/caregiver.      Health Maintenance Due     Health Maintenance Due   Topic Date Due    Hepatitis B Vaccine (1 of 3 - Risk 3-dose series) Never done    Shingrix Vaccine Age 49> (1 of 2) Never done    COVID-19 Vaccine (3 - Booster for Pfizer series) 06/03/2021    Lipid Screen  06/11/2022    Flu Vaccine (1) 08/01/2022    DTaP/Tdap/Td series (2 - Td or Tdap) 09/14/2022    Medicare Yearly Exam  10/25/2022    Colorectal Cancer Screening Combo  10/29/2022       Patient Care Team   Patient Care Team:  Jennifer Villanueva MD as PCP - General  Jennifer Villanueva MD as PCP - [facility] Empaneled Provider  Dwaine Stern MD as Physician (Neurosurgery)  Kamla Gutierrez MD as Surgeon (Vascular Surgery)  Phoebe Schneider MD (Ophthalmology)  Natalya Holm MD (Gastroenterology)  Milka Gonzalez MD (Obstetrics & Gynecology)  Noemy Ferreira DO (Neurology)    History     Past Medical History:   Diagnosis Date    Anxiety     CAD (coronary artery disease)     Cerebral artery occlusion with cerebral infarction [facility])     Colon polyps     Depression     GERD (gastroesophageal reflux disease)     Hemorrhoids     Hepatitis C     genotype 1    History of kidney stones     Hx of amaurosis fugax     right eye, 2014    Liver disease 2011    hep c, [place] 2011    Meningioma [facility]) 2014    SURGERY TO BE SCHEDULED    Migraines     Osteoporosis 2008    Psychiatric disorder     ANXIETY AND DEPRESSION    PVD (peripheral vascular disease) ([facility] [address] 2/2014    W/STENT , DR Larueen Olvera    Seizures ([facility] [address] 2013    ?  SEIZURE, CONFUSION, TROUBLE WITH SPEECH      Past Surgical History:   Procedure Laterality Date    HX CHOLECYSTECTOMY  2001    HX COLONOSCOPY  10/27/2015    HX CRANIOTOMY  6/25/14    meningioma r medial sphenoid wing    HX GI  2002    gall bladder removal    HX GYN  1984    Tubal pregnancy    HX HYSTERECTOMY      HX OTHER SURGICAL  2/27/2014    Dr. Catherine Peres  2/2014    ABD AORTIC STENT    VASCULAR SURGERY PROCEDURE UNLIST Right 6/10/14    CAROTID Current Outpatient Medications   Medication Sig Dispense Refill    estradioL (ESTRACE) 0.01 % (0.1 mg/gram) vaginal cream INSERT 2 GRAMS VAGINALLY 2 TIMES A WEEK 42.5 g 1    esomeprazole (NEXIUM) 40 mg capsule TAKE 1 CAPSULE DAILY 90 Capsule 0    atorvastatin (LIPITOR) 20 mg tablet TAKE 1 TABLET DAILY 90 Tablet 2    albuterol (PROVENTIL HFA, VENTOLIN HFA, PROAIR HFA) 90 mcg/actuation inhaler Take 2 Puffs by inhalation every four (4) hours as needed for Wheezing. 1 Each 5    denosumab (Prolia) 60 mg/mL injection 60 mg by SubCUTAneous route. citalopram (CELEXA) 10 mg tablet       dextroamphetamine-amphetamine (ADDERALL) 30 mg tablet Take 15 mg by mouth two (2) times a day. 0    aspirin delayed-release 81 mg tablet Take 81 mg by mouth nightly. cholecalciferol, vitamin D3, 50 mcg (2,000 unit) tab Take 2,000 Units by mouth daily. clonazePAM (KlonoPIN) 0.5 mg tablet Take 0.5 mg by mouth nightly.        Allergies   Allergen Reactions    Tetracycline Other (comments)     GI upset       Family History   Problem Relation Age of Onset    Heart Disease Mother         Heart attack and triple bypass    Anxiety Mother     Cancer Mother         Lung    Heart Surgery Mother         CABG X3 VESSEL    Alcohol abuse Mother         Quit Drinking 1986    Hypertension Mother         on medication    Lung Disease Mother         COPD    Anxiety Sister     Alcohol abuse Father         Never stoped    Asthma Father         Had since a child    Cancer Sister         Vaginal    Asthma Sister     Heart Disease Sister         mild heart attach 2018    Hypertension Sister         on medication    Lung Disease Sister         COPD    Diabetes Sister     Heart Disease Maternal Grandmother         heart aneurysm 46 of age 56    Heart Disease Paternal Grandmother         several heart attacks    Hypertension Paternal Grandmother         on medication    Stroke Paternal Grandmother         Multiple strokes in her 66's OSTEOARTHRITIS Paternal Grandmother         arthritis pain in hands    Heart Disease Paternal Grandfather         major heart attack cause of death    Lung Disease Maternal Grandfather         COPD    Heart Disease Maternal Grandfather         cardiac arrest due to Cor pulmonale, Emphysema and COPD    Anesth Problems Neg Hx      Social History     Tobacco Use    Smoking status: Every Day     Packs/day: 0.50     Years: 43.00     Pack years: 21.50     Types: Cigarettes    Smokeless tobacco: Never    Tobacco comments:     joined [employer] Quit for Life   Substance Use Topics    Alcohol use: No     Alcohol/week: 0.0 standard drinks       Rose Hartman MD       Discussed with the patient the current USPSTF guidelines released March 9, 2021 for screening for lung cancer. For adults aged 48 to [de-identified] years who have a 20 pack-year smoking history and currently smoke or have quit within the past 15 years the grade B recommendation is to:  Screen for lung cancer with low-dose computed tomography (LDCT) every year. Stop screening once a person has not smoked for 15 years or has a health problem that limits life expectancy or the ability to have lung surgery. The patient has a 21 1/2 pack-year history of cigarette smoking and currently is still smoking. Discussed with patient the risks and benefits of screening, including over-diagnosis, false positive rate, and total radiation exposure. The patient currently exhibits no signs or symptoms suggestive of lung cancer. Discussed with patient the importance of compliance with yearly annual lung cancer screenings and willingness to undergo diagnosis and treatment if screening scan is positive. In addition, the patient was counseled regarding the importance of remaining smoke free and/or total smoking cessation.     Also reviewed the following if the patient has Medicare that as of February 10, 2022, Medicare only covers LDCT screening in patients aged 51-72 with at least a 20 pack-year smoking history who currently smoke or have quit in the last 15 years

## 2022-10-27 NOTE — PATIENT INSTRUCTIONS
Medicare Wellness Visit, Female     The best way to live healthy is to have a lifestyle where you eat a well-balanced diet, exercise regularly, limit alcohol use, and quit all forms of tobacco/nicotine, if applicable. Regular preventive services are another way to keep healthy. Preventive services (vaccines, screening tests, monitoring & exams) can help personalize your care plan, which helps you manage your own care. Screening tests can find health problems at the earliest stages, when they are easiest to treat. [facility] follows the current, evidence-based guidelines published by the [facility] ([facility]) when recommending preventive services for our patients. Because we follow these guidelines, sometimes recommendations change over time as research supports it. (For example, mammograms used to be recommended annually. Even though Medicare will still pay for an annual mammogram, the newer guidelines recommend a mammogram every two years for women of average risk). Of course, you and your doctor may decide to screen more often for some diseases, based on your risk and your co-morbidities (chronic disease you are already diagnosed with). Preventive services for you include:  - Medicare offers their members a free annual wellness visit, which is time for you and your primary care provider to discuss and plan for your preventive service needs. Take advantage of this benefit every year!  -All adults over the age of 72 should receive the recommended pneumonia vaccines. Current USPSTF guidelines recommend a series of two vaccines for the best pneumonia protection.   -All adults should have a flu vaccine yearly and a tetanus vaccine every 10 years.   -All adults age 48 and older should receive the shingles vaccines (series of two vaccines).       -All adults age 38-68 who are overweight should have a diabetes screening test once every three years.   -All adults born between 80 and 1965 should be screened once for Hepatitis C.  -Other screening tests and preventive services for persons with diabetes include: an eye exam to screen for diabetic retinopathy, a kidney function test, a foot exam, and stricter control over your cholesterol.   -Cardiovascular screening for adults with routine risk involves an electrocardiogram (ECG) at intervals determined by your doctor.   -Colorectal cancer screenings should be done for adults age 54-65 with no increased risk factors for colorectal cancer. There are a number of acceptable methods of screening for this type of cancer. Each test has its own benefits and drawbacks. Discuss with your doctor what is most appropriate for you during your annual wellness visit. The different tests include: colonoscopy (considered the best screening method), a fecal occult blood test, a fecal DNA test, and sigmoidoscopy.    -A bone mass density test is recommended when a woman turns 65 to screen for osteoporosis. This test is only recommended one time, as a screening. Some providers will use this same test as a disease monitoring tool if you already have osteoporosis. -Breast cancer screenings are recommended every other year for women of normal risk, age 54-69.  -Cervical cancer screenings for women over age 72 are only recommended with certain risk factors.      Here is a list of your current Health Maintenance items (your personalized list of preventive services) with a due date:  Health Maintenance Due   Topic Date Due    Hepatitis B Vaccine (1 of 3 - Risk 3-dose series) Never done    Shingles Vaccine (1 of 2) Never done    COVID-19 Vaccine (3 - Booster for Pfizer series) 06/03/2021    Cholesterol Test   06/11/2022    Yearly Flu Vaccine (1) 08/01/2022    DTaP/Tdap/Td  (2 - Td or Tdap) 09/14/2022    Annual Well Visit  10/25/2022    Colorectal Screening  10/29/2022

## 2022-11-10 ENCOUNTER — PATIENT MESSAGE (OUTPATIENT)
Dept: INTERNAL MEDICINE CLINIC | Age: 67
End: 2022-11-10

## 2022-11-10 DIAGNOSIS — K21.9 GASTROESOPHAGEAL REFLUX DISEASE: ICD-10-CM

## 2022-11-11 RX ORDER — ESOMEPRAZOLE MAGNESIUM 40 MG/1
40 CAPSULE, DELAYED RELEASE ORAL DAILY
Qty: 90 CAPSULE | Refills: 0 | Status: SHIPPED | OUTPATIENT
Start: 2022-11-11

## 2022-12-13 ENCOUNTER — HOSPITAL ENCOUNTER (OUTPATIENT)
Dept: MAMMOGRAPHY | Age: 67
Discharge: HOME | End: 2022-12-13
Attending: INTERNAL MEDICINE
Payer: MEDICARE

## 2022-12-13 ENCOUNTER — HOSPITAL ENCOUNTER (OUTPATIENT)
Dept: CT IMAGING | Age: 67
Discharge: HOME | End: 2022-12-13
Attending: INTERNAL MEDICINE
Payer: MEDICARE

## 2022-12-13 VITALS — BODY MASS INDEX: 26.81 KG/M2 | HEIGHT: 61 IN | WEIGHT: 142 LBS

## 2022-12-13 DIAGNOSIS — Z87.891 PERSONAL HISTORY OF TOBACCO USE, PRESENTING HAZARDS TO HEALTH: ICD-10-CM

## 2022-12-13 DIAGNOSIS — Z12.31 SCREENING MAMMOGRAM FOR BREAST CANCER: ICD-10-CM

## 2022-12-13 PROCEDURE — 77067 SCR MAMMO BI INCL CAD: CPT

## 2022-12-13 PROCEDURE — 71271 CT THORAX LUNG CANCER SCR C-: CPT

## 2022-12-27 ENCOUNTER — OFFICE VISIT (OUTPATIENT)
Dept: INTERNAL MEDICINE CLINIC | Age: 67
End: 2022-12-27
Payer: MEDICARE

## 2022-12-27 ENCOUNTER — TELEPHONE (OUTPATIENT)
Dept: INTERNAL MEDICINE CLINIC | Age: 67
End: 2022-12-27

## 2022-12-27 VITALS
HEIGHT: 61 IN | SYSTOLIC BLOOD PRESSURE: 149 MMHG | HEART RATE: 104 BPM | WEIGHT: 140.2 LBS | OXYGEN SATURATION: 98 % | DIASTOLIC BLOOD PRESSURE: 75 MMHG | BODY MASS INDEX: 26.47 KG/M2 | RESPIRATION RATE: 24 BRPM | TEMPERATURE: 98.6 F

## 2022-12-27 DIAGNOSIS — R05.2 SUBACUTE COUGH: ICD-10-CM

## 2022-12-27 DIAGNOSIS — H66.90 ACUTE OTITIS MEDIA, UNSPECIFIED OTITIS MEDIA TYPE: Primary | ICD-10-CM

## 2022-12-27 PROCEDURE — G9717 DOC PT DX DEP/BP F/U NT REQ: HCPCS | Performed by: STUDENT IN AN ORGANIZED HEALTH CARE EDUCATION/TRAINING PROGRAM

## 2022-12-27 PROCEDURE — G8427 DOCREV CUR MEDS BY ELIG CLIN: HCPCS | Performed by: STUDENT IN AN ORGANIZED HEALTH CARE EDUCATION/TRAINING PROGRAM

## 2022-12-27 PROCEDURE — G8417 CALC BMI ABV UP PARAM F/U: HCPCS | Performed by: STUDENT IN AN ORGANIZED HEALTH CARE EDUCATION/TRAINING PROGRAM

## 2022-12-27 PROCEDURE — G8399 PT W/DXA RESULTS DOCUMENT: HCPCS | Performed by: STUDENT IN AN ORGANIZED HEALTH CARE EDUCATION/TRAINING PROGRAM

## 2022-12-27 PROCEDURE — G8536 NO DOC ELDER MAL SCRN: HCPCS | Performed by: STUDENT IN AN ORGANIZED HEALTH CARE EDUCATION/TRAINING PROGRAM

## 2022-12-27 PROCEDURE — 3017F COLORECTAL CA SCREEN DOC REV: CPT | Performed by: STUDENT IN AN ORGANIZED HEALTH CARE EDUCATION/TRAINING PROGRAM

## 2022-12-27 PROCEDURE — 99213 OFFICE O/P EST LOW 20 MIN: CPT | Performed by: STUDENT IN AN ORGANIZED HEALTH CARE EDUCATION/TRAINING PROGRAM

## 2022-12-27 PROCEDURE — G9899 SCRN MAM PERF RSLTS DOC: HCPCS | Performed by: STUDENT IN AN ORGANIZED HEALTH CARE EDUCATION/TRAINING PROGRAM

## 2022-12-27 PROCEDURE — 1101F PT FALLS ASSESS-DOCD LE1/YR: CPT | Performed by: STUDENT IN AN ORGANIZED HEALTH CARE EDUCATION/TRAINING PROGRAM

## 2022-12-27 PROCEDURE — 1090F PRES/ABSN URINE INCON ASSESS: CPT | Performed by: STUDENT IN AN ORGANIZED HEALTH CARE EDUCATION/TRAINING PROGRAM

## 2022-12-27 RX ORDER — CEFPODOXIME PROXETIL 200 MG/1
200 TABLET, FILM COATED ORAL 2 TIMES DAILY
Qty: 14 TABLET | Refills: 0 | Status: SHIPPED | OUTPATIENT
Start: 2022-12-27 | End: 2023-01-03

## 2022-12-27 RX ORDER — BENZONATATE 200 MG/1
200 CAPSULE ORAL
Qty: 21 CAPSULE | Refills: 0 | Status: SHIPPED | OUTPATIENT
Start: 2022-12-27 | End: 2023-01-03

## 2022-12-27 NOTE — TELEPHONE ENCOUNTER
Pt. Was notified by  that she needs a negative covid test today prior to coming in  or offered Urgent Care or VV call was disconnected and I left the same options on her VM. Shw is scheduled 11:30a with Dr Ana Ni.

## 2022-12-27 NOTE — PROGRESS NOTES
Sendy Pérez is a 74 y.o. female who was seen in clinic today (12/27/2022) for an acute visit. Assessment & Plan:   Below is the assessment and plan developed based on review of pertinent history, physical exam, labs, studies, and medications. 1. Acute otitis media, unspecified otitis media type  She has ear pain, decreased hearing, and tender anterior cervical chain. Will treat as otitis media/bacteria sinusitis given persistent and recurrent symptoms with painful LAD, hx fever. She had diarrhea on Augmentin and had to stop taking it, will use cefpodoxime this time. Can take loperamide for antibiotic-assoc diarrhea. ,   -     cefpodoxime (VANTIN) 200 mg tablet; Take 1 Tablet by mouth two (2) times a day for 7 days. , Normal, Disp-14 Tablet, R-0  2. Subacute cough  Comments:  symptomatic tx with tessalon  Orders:  -     benzonatate (TESSALON) 200 mg capsule; Take 1 Capsule by mouth three (3) times daily as needed for Cough for up to 7 days. , Normal, Disp-21 Capsule, R-0    Subjective:   Edward Garcia was seen today for Cold Symptoms    For the last 8 weeks she has not felt well. Started with nasal congestion and coughing, then post-nasal drip. This was improving after 2 weeks on OTC agents. Around thanksgiving she felt ill again, coughing, nasal congestion, sore throat. She did take her mom to get her hair done right before sx started. Sx lasted for 2 weeks. She also had a lot of pain behind her R eye, which concerns her because of her history of meningioma. Now she has throat tenderness, cough which is productive of scant green mucous, decreased hearing/ ears popping with pain. She doesn't have much nasal discharge. This has been going on for 2 weeks. She has a bit of SOB, has cut back smoking. She is using albuterol QHS which helps. She had fever for a week last week, broke this weekend. 12/4 when to pt [facility] and was given Augmentin which gave her diarrhea so she stopped.  Also given tessalon which does help her cough    COVID negative today.  is well, not sick. Daughter and grandson also with with her, they are well. 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 A    Moderate major depression ([facility] [address] F32.1       Prior to Admission medications    Medication Sig Start Date End Date Taking? Authorizing Provider   benzonatate (TESSALON) 200 mg capsule Take 1 Capsule by mouth three (3) times daily as needed for Cough for up to 7 days. 12/27/22 1/3/23 Yes Tracy Mosqueda MD   cefpodoxime Minna Began) 200 mg tablet Take 1 Tablet by mouth two (2) times a day for 7 days. 12/27/22 1/3/23 Yes Romayne Macintosh, MD   esomeprazole (NEXIUM) 40 mg capsule Take 1 Capsule by mouth daily. 11/11/22  Yes Chin Garcia MD   estradioL (ESTRACE) 0.01 % (0.1 mg/gram) vaginal cream INSERT 2 GRAMS VAGINALLY 2 TIMES A WEEK 10/11/22  Yes Jose Cruz Bermudez III, MD   atorvastatin (LIPITOR) 20 mg tablet TAKE 1 TABLET DAILY 5/22/22  Yes Jose Cruz Bermudez III, MD   albuterol (PROVENTIL HFA, VENTOLIN HFA, PROAIR HFA) 90 mcg/actuation inhaler Take 2 Puffs by inhalation every four (4) hours as needed for Wheezing. 4/26/22  Yes Chin Garcia MD   denosumab (Prolia) 60 mg/mL injection 60 mg by SubCUTAneous route. Yes Provider, Historical   citalopram (CELEXA) 10 mg tablet  9/16/18  Yes Provider, Historical   dextroamphetamine-amphetamine (ADDERALL) 30 mg tablet Take 15 mg by mouth two (2) times a day. 8/10/17  Yes Provider, Historical   aspirin delayed-release 81 mg tablet Take 81 mg by mouth nightly.    Yes Provider, Historical   cholecalciferol, vitamin D3, 50 mcg (2,000 unit) tab Take 2,000 Units by mouth daily. Yes Provider, Historical   clonazePAM (KlonoPIN) 0.5 mg tablet Take 0.5 mg by mouth nightly. Yes Provider, Historical   gabapentin (NEURONTIN) 100 mg capsule Take 1 Capsule by mouth three (3) times daily as needed for Pain. Max Daily Amount: 300 mg. Patient not taking: Reported on 12/27/2022 10/27/22   Hyacinth Serrano MD       Objective:   Visit Vitals  BP (!) 149/75 (BP 1 Location: Right arm, BP Patient Position: Sitting, BP Cuff Size: Adult)   Pulse (!) 104   Temp 98.6 °F (37 °C) (Temporal)   Resp 24   Ht 5' 1\" (1.549 m)   Wt 140 lb 3.2 oz (63.6 kg)   SpO2 98%   BMI 26.49 kg/m²       Physical Exam  Constitutional:       General: She is not in acute distress. Appearance: She is not toxic-appearing. Cardiovascular:      Rate and Rhythm: Normal rate and regular rhythm. Pulmonary:      Effort: Pulmonary effort is normal. No respiratory distress. Breath sounds: Normal breath sounds. No wheezing or rales. Comments: cough  Musculoskeletal:      Cervical back: Neck supple. Tenderness (L anterior lymph node chain) present. Neurological:      Mental Status: She is alert. Advised her to call back or return to office if symptoms worsen/change/persist.  Discussed expected course/resolution/complications of diagnosis in detail with patient.     Corinna Forte MD

## 2023-01-27 NOTE — TELEPHONE ENCOUNTER
Requested Prescriptions     Pending Prescriptions Disp Refills    atorvastatin (LIPITOR) 20 mg tablet 90 Tablet 2     Si Tablet daily.      [facility] - [address], [address] [facility]  [phone]

## 2023-01-29 RX ORDER — ATORVASTATIN CALCIUM 20 MG/1
TABLET, FILM COATED ORAL
Qty: 90 TABLET | Refills: 2 | Status: SHIPPED | OUTPATIENT
Start: 2023-01-29

## 2023-02-08 RX ORDER — ESTRADIOL 0.1 MG/G
CREAM VAGINAL
Qty: 42.5 G | Refills: 1 | Status: SHIPPED | OUTPATIENT
Start: 2023-02-08

## 2023-02-17 DIAGNOSIS — K21.9 GASTROESOPHAGEAL REFLUX DISEASE: ICD-10-CM

## 2023-02-18 RX ORDER — ESOMEPRAZOLE MAGNESIUM 40 MG/1
CAPSULE, DELAYED RELEASE ORAL
Qty: 90 CAPSULE | Refills: 0 | Status: SHIPPED | OUTPATIENT
Start: 2023-02-18

## 2023-04-24 DIAGNOSIS — K21.9 GASTROESOPHAGEAL REFLUX DISEASE: ICD-10-CM

## 2023-04-25 RX ORDER — ESOMEPRAZOLE MAGNESIUM 40 MG/1
CAPSULE, DELAYED RELEASE ORAL
Qty: 90 CAPSULE | Refills: 0 | Status: SHIPPED | OUTPATIENT
Start: 2023-04-25

## 2023-07-03 ENCOUNTER — HOSPITAL ENCOUNTER (OUTPATIENT)
Age: 68
Discharge: HOME | End: 2023-07-06
Payer: MEDICARE

## 2023-07-03 DIAGNOSIS — D32.9 MENINGIOMA (HCC): ICD-10-CM

## 2023-07-03 PROCEDURE — 70553 MRI BRAIN STEM W/O & W/DYE: CPT

## 2023-07-03 PROCEDURE — 6360000004 HC RX CONTRAST MEDICATION: Performed by: RADIOLOGY

## 2023-07-03 PROCEDURE — A9579 GAD-BASE MR CONTRAST NOS,1ML: HCPCS | Performed by: RADIOLOGY

## 2023-07-03 RX ADMIN — GADOTERIDOL 12 ML: 279.3 INJECTION, SOLUTION INTRAVENOUS at 16:15

## 2023-08-08 RX ORDER — ESOMEPRAZOLE MAGNESIUM 40 MG/1
CAPSULE, DELAYED RELEASE ORAL
Qty: 90 CAPSULE | Refills: 0 | Status: SHIPPED | OUTPATIENT
Start: 2023-08-08

## 2023-10-23 RX ORDER — ESTRADIOL 0.1 MG/G
CREAM VAGINAL
Qty: 42.5 G | Refills: 2 | Status: SHIPPED | OUTPATIENT
Start: 2023-10-23

## 2023-11-21 RX ORDER — ATORVASTATIN CALCIUM 20 MG/1
TABLET, FILM COATED ORAL
Qty: 90 TABLET | Refills: 2 | Status: SHIPPED | OUTPATIENT
Start: 2023-11-21

## 2023-11-21 RX ORDER — ESOMEPRAZOLE MAGNESIUM 40 MG/1
CAPSULE, DELAYED RELEASE ORAL
Qty: 90 CAPSULE | Refills: 0 | Status: SHIPPED | OUTPATIENT
Start: 2023-11-21

## 2023-12-11 ENCOUNTER — TELEPHONE (OUTPATIENT)
Age: 68
End: 2023-12-11

## 2023-12-11 NOTE — TELEPHONE ENCOUNTER
Reason for call:  Spoke with pt's . Wife is not doing well. Unable to find an jaya for Urget care follow up do to bronchitis. Please adv ?     Is this a new problem: Yes    Date of last appointment:  12/27/2022     Can we respond via [facility]: No    Best call back number: Ryan Lala   [phone]

## 2023-12-11 NOTE — TELEPHONE ENCOUNTER
Returned call to patient, states she has been seen at urgent care x2 dx w/ bronchitis - has completed abx and steroids however still having cough, not sleeping well, overall feeling weak w/ decreased appetite. Denies any fever/SOB. No appts in office today, pt states cannot come in tomorrow d/t no transportation - scheduled w/ Dr. Alyx Reece for Wednesday AM, red flags to warrant 911/ED reviewed, pt voiced understanding.     Future Appointments   Date Time Provider [address] [place] [address]   12/13/2023  8:30 AM MD CHRIS Perez BS AMB   1/29/2024  4:00 PM Remy Farley MD [facility] GAYATHRI BS AMB

## 2023-12-12 SDOH — ECONOMIC STABILITY: FOOD INSECURITY: WITHIN THE PAST 12 MONTHS, THE FOOD YOU BOUGHT JUST DIDN'T LAST AND YOU DIDN'T HAVE MONEY TO GET MORE.: NEVER TRUE

## 2023-12-12 SDOH — ECONOMIC STABILITY: FOOD INSECURITY: WITHIN THE PAST 12 MONTHS, YOU WORRIED THAT YOUR FOOD WOULD RUN OUT BEFORE YOU GOT MONEY TO BUY MORE.: NEVER TRUE

## 2023-12-12 SDOH — ECONOMIC STABILITY: TRANSPORTATION INSECURITY
IN THE PAST 12 MONTHS, HAS LACK OF TRANSPORTATION KEPT YOU FROM MEETINGS, WORK, OR FROM GETTING THINGS NEEDED FOR DAILY LIVING?: NO

## 2023-12-12 SDOH — ECONOMIC STABILITY: INCOME INSECURITY: HOW HARD IS IT FOR YOU TO PAY FOR THE VERY BASICS LIKE FOOD, HOUSING, MEDICAL CARE, AND HEATING?: NOT HARD AT ALL

## 2023-12-12 SDOH — ECONOMIC STABILITY: HOUSING INSECURITY
IN THE LAST 12 MONTHS, WAS THERE A TIME WHEN YOU DID NOT HAVE A STEADY PLACE TO SLEEP OR SLEPT IN A SHELTER (INCLUDING NOW)?: NO

## 2023-12-13 ENCOUNTER — OFFICE VISIT (OUTPATIENT)
Age: 68
End: 2023-12-13
Payer: MEDICARE

## 2023-12-13 ENCOUNTER — HOSPITAL ENCOUNTER (OUTPATIENT)
Age: 68
Discharge: HOME | End: 2023-12-16
Payer: MEDICARE

## 2023-12-13 VITALS
DIASTOLIC BLOOD PRESSURE: 84 MMHG | OXYGEN SATURATION: 98 % | TEMPERATURE: 98 F | HEIGHT: 61 IN | HEART RATE: 104 BPM | WEIGHT: 138.6 LBS | SYSTOLIC BLOOD PRESSURE: 135 MMHG | RESPIRATION RATE: 24 BRPM | BODY MASS INDEX: 26.17 KG/M2

## 2023-12-13 DIAGNOSIS — J40 BRONCHITIS: Primary | ICD-10-CM

## 2023-12-13 DIAGNOSIS — J40 BRONCHITIS: ICD-10-CM

## 2023-12-13 PROCEDURE — 1123F ACP DISCUSS/DSCN MKR DOCD: CPT | Performed by: STUDENT IN AN ORGANIZED HEALTH CARE EDUCATION/TRAINING PROGRAM

## 2023-12-13 PROCEDURE — 99214 OFFICE O/P EST MOD 30 MIN: CPT | Performed by: STUDENT IN AN ORGANIZED HEALTH CARE EDUCATION/TRAINING PROGRAM

## 2023-12-13 PROCEDURE — 71046 X-RAY EXAM CHEST 2 VIEWS: CPT

## 2023-12-13 RX ORDER — FLUTICASONE PROPIONATE AND SALMETEROL 100; 50 UG/1; UG/1
1 POWDER RESPIRATORY (INHALATION) EVERY 12 HOURS
Qty: 30 EACH | Refills: 1 | Status: SHIPPED | OUTPATIENT
Start: 2023-12-13

## 2023-12-13 ASSESSMENT — PATIENT HEALTH QUESTIONNAIRE - PHQ9
7. TROUBLE CONCENTRATING ON THINGS, SUCH AS READING THE NEWSPAPER OR WATCHING TELEVISION: 0
2. FEELING DOWN, DEPRESSED OR HOPELESS: 0
1. LITTLE INTEREST OR PLEASURE IN DOING THINGS: 0
5. POOR APPETITE OR OVEREATING: 0
9. THOUGHTS THAT YOU WOULD BE BETTER OFF DEAD, OR OF HURTING YOURSELF: 0
3. TROUBLE FALLING OR STAYING ASLEEP: 0
10. IF YOU CHECKED OFF ANY PROBLEMS, HOW DIFFICULT HAVE THESE PROBLEMS MADE IT FOR YOU TO DO YOUR WORK, TAKE CARE OF THINGS AT HOME, OR GET ALONG WITH OTHER PEOPLE: 0
SUM OF ALL RESPONSES TO PHQ QUESTIONS 1-9: 0
SUM OF ALL RESPONSES TO PHQ9 QUESTIONS 1 & 2: 0
SUM OF ALL RESPONSES TO PHQ QUESTIONS 1-9: 0
8. MOVING OR SPEAKING SO SLOWLY THAT OTHER PEOPLE COULD HAVE NOTICED. OR THE OPPOSITE, BEING SO FIGETY OR RESTLESS THAT YOU HAVE BEEN MOVING AROUND A LOT MORE THAN USUAL: 0
6. FEELING BAD ABOUT YOURSELF - OR THAT YOU ARE A FAILURE OR HAVE LET YOURSELF OR YOUR FAMILY DOWN: 0
4. FEELING TIRED OR HAVING LITTLE ENERGY: 0

## 2023-12-13 NOTE — PROGRESS NOTES
rhonchi or rales. Comments: + junky cough  Abdominal:      General: Bowel sounds are normal.      Palpations: Abdomen is soft. Neurological:      Mental Status: She is alert. Psychiatric:         Mood and Affect: Mood normal.         Behavior: Behavior normal.         Advised her to call back or return to office if symptoms worsen/change/persist.  Discussed expected course/resolution/complications of diagnosis in detail with patient.     Maral Cornell MD

## 2023-12-13 NOTE — PATIENT INSTRUCTIONS
Take mucinex DM 12 hours to break up mucous in sinuses and chest  Take flonase for nasal congestion/pressure  STOP SMOKING - use nicotine lozenge instead     I will call if you chest xray shows pneumonia

## 2024-01-15 SDOH — HEALTH STABILITY: PHYSICAL HEALTH: ON AVERAGE, HOW MANY DAYS PER WEEK DO YOU ENGAGE IN MODERATE TO STRENUOUS EXERCISE (LIKE A BRISK WALK)?: 3 DAYS

## 2024-01-15 SDOH — HEALTH STABILITY: PHYSICAL HEALTH: ON AVERAGE, HOW MANY MINUTES DO YOU ENGAGE IN EXERCISE AT THIS LEVEL?: 30 MIN

## 2024-01-15 ASSESSMENT — PATIENT HEALTH QUESTIONNAIRE - PHQ9
SUM OF ALL RESPONSES TO PHQ QUESTIONS 1-9: 2
SUM OF ALL RESPONSES TO PHQ QUESTIONS 1-9: 2
1. LITTLE INTEREST OR PLEASURE IN DOING THINGS: 1
SUM OF ALL RESPONSES TO PHQ9 QUESTIONS 1 & 2: 2
SUM OF ALL RESPONSES TO PHQ QUESTIONS 1-9: 2
2. FEELING DOWN, DEPRESSED OR HOPELESS: 1
SUM OF ALL RESPONSES TO PHQ QUESTIONS 1-9: 2

## 2024-01-15 ASSESSMENT — LIFESTYLE VARIABLES
HOW OFTEN DO YOU HAVE A DRINK CONTAINING ALCOHOL: NEVER
HOW MANY STANDARD DRINKS CONTAINING ALCOHOL DO YOU HAVE ON A TYPICAL DAY: PATIENT DOES NOT DRINK

## 2024-01-31 ENCOUNTER — OFFICE VISIT (OUTPATIENT)
Age: 69
End: 2024-01-31
Payer: MEDICARE

## 2024-01-31 VITALS — SYSTOLIC BLOOD PRESSURE: 100 MMHG | WEIGHT: 140.2 LBS | DIASTOLIC BLOOD PRESSURE: 59 MMHG | BODY MASS INDEX: 26.49 KG/M2

## 2024-01-31 DIAGNOSIS — Z01.419 ENCOUNTER FOR GYNECOLOGICAL EXAMINATION (GENERAL) (ROUTINE) WITHOUT ABNORMAL FINDINGS: Primary | ICD-10-CM

## 2024-01-31 PROCEDURE — 99387 INIT PM E/M NEW PAT 65+ YRS: CPT | Performed by: OBSTETRICS & GYNECOLOGY

## 2024-01-31 NOTE — PROGRESS NOTES
Kae Wynne is a 68 y.o. female returns for an annual exam     Chief Complaint   Patient presents with    New Patient    Annual Exam       No LMP recorded. Patient has had a hysterectomy.  Her periods are absent due to hysterectomy  She does not have dysmenorrhea.  Problems: problems - vaginal ddryness  Birth Control: status post hysterectomy.  Last Pap: 2023 and was normal per pt; has history of abnormal pap smear but can't recall when  She does not have a history of RADHA 2, 3 or cervical cancer.   Last Mammogram:  12/13/2022 negative  Last Bone Density: 2022 osteoporosis per pt  Last colonoscopy: 2022 and had precancerous polyp removed per pt      Examination chaperoned by Tiffany Alford LPN.  
present  Inguinal Lymph Nodes: no lymphadenopathy present    Assessment:  Routine gynecologic examination  Her current medical status is satisfactory with no evidence of significant gynecologic issues.    Plan:  Counseled re: diet, exercise, healthy lifestyle  Return for yearly wellness visits  Rec annual mammogram  pap

## 2024-02-04 SDOH — HEALTH STABILITY: PHYSICAL HEALTH: ON AVERAGE, HOW MANY DAYS PER WEEK DO YOU ENGAGE IN MODERATE TO STRENUOUS EXERCISE (LIKE A BRISK WALK)?: 3 DAYS

## 2024-02-04 SDOH — HEALTH STABILITY: PHYSICAL HEALTH: ON AVERAGE, HOW MANY MINUTES DO YOU ENGAGE IN EXERCISE AT THIS LEVEL?: 30 MIN

## 2024-02-04 ASSESSMENT — PATIENT HEALTH QUESTIONNAIRE - PHQ9
SUM OF ALL RESPONSES TO PHQ QUESTIONS 1-9: 2
2. FEELING DOWN, DEPRESSED OR HOPELESS: 1
SUM OF ALL RESPONSES TO PHQ9 QUESTIONS 1 & 2: 2
1. LITTLE INTEREST OR PLEASURE IN DOING THINGS: 1
SUM OF ALL RESPONSES TO PHQ QUESTIONS 1-9: 2

## 2024-02-04 ASSESSMENT — LIFESTYLE VARIABLES
HOW MANY STANDARD DRINKS CONTAINING ALCOHOL DO YOU HAVE ON A TYPICAL DAY: 0
HOW MANY STANDARD DRINKS CONTAINING ALCOHOL DO YOU HAVE ON A TYPICAL DAY: PATIENT DOES NOT DRINK
HOW OFTEN DO YOU HAVE A DRINK CONTAINING ALCOHOL: 1
HOW OFTEN DO YOU HAVE A DRINK CONTAINING ALCOHOL: NEVER
HOW OFTEN DO YOU HAVE SIX OR MORE DRINKS ON ONE OCCASION: 1

## 2024-02-06 ENCOUNTER — OFFICE VISIT (OUTPATIENT)
Age: 69
End: 2024-02-06
Payer: MEDICARE

## 2024-02-06 VITALS
HEART RATE: 86 BPM | WEIGHT: 139.4 LBS | OXYGEN SATURATION: 97 % | SYSTOLIC BLOOD PRESSURE: 122 MMHG | TEMPERATURE: 98 F | RESPIRATION RATE: 15 BRPM | HEIGHT: 61 IN | DIASTOLIC BLOOD PRESSURE: 77 MMHG | BODY MASS INDEX: 26.32 KG/M2

## 2024-02-06 DIAGNOSIS — I65.21 OCCLUSION AND STENOSIS OF RIGHT CAROTID ARTERY: ICD-10-CM

## 2024-02-06 DIAGNOSIS — Z72.0 TOBACCO ABUSE: ICD-10-CM

## 2024-02-06 DIAGNOSIS — I63.9 CEREBRAL INFARCTION, UNSPECIFIED MECHANISM (HCC): ICD-10-CM

## 2024-02-06 DIAGNOSIS — B18.2 CHRONIC HEPATITIS C WITHOUT HEPATIC COMA (HCC): ICD-10-CM

## 2024-02-06 DIAGNOSIS — M81.0 AGE-RELATED OSTEOPOROSIS WITHOUT CURRENT PATHOLOGICAL FRACTURE: ICD-10-CM

## 2024-02-06 DIAGNOSIS — Z12.11 COLON CANCER SCREENING: ICD-10-CM

## 2024-02-06 DIAGNOSIS — F32.1 MAJOR DEPRESSIVE DISORDER, SINGLE EPISODE, MODERATE (HCC): ICD-10-CM

## 2024-02-06 DIAGNOSIS — D32.9 BENIGN NEOPLASM OF MENINGES, UNSPECIFIED (HCC): ICD-10-CM

## 2024-02-06 DIAGNOSIS — Z00.00 MEDICARE ANNUAL WELLNESS VISIT, SUBSEQUENT: Primary | ICD-10-CM

## 2024-02-06 DIAGNOSIS — E78.2 MIXED HYPERLIPIDEMIA: ICD-10-CM

## 2024-02-06 PROCEDURE — G0439 PPPS, SUBSEQ VISIT: HCPCS | Performed by: INTERNAL MEDICINE

## 2024-02-06 PROCEDURE — 99214 OFFICE O/P EST MOD 30 MIN: CPT | Performed by: INTERNAL MEDICINE

## 2024-02-06 PROCEDURE — 1123F ACP DISCUSS/DSCN MKR DOCD: CPT | Performed by: INTERNAL MEDICINE

## 2024-02-06 ASSESSMENT — PATIENT HEALTH QUESTIONNAIRE - PHQ9
2. FEELING DOWN, DEPRESSED OR HOPELESS: 1
1. LITTLE INTEREST OR PLEASURE IN DOING THINGS: 1
SUM OF ALL RESPONSES TO PHQ9 QUESTIONS 1 & 2: 2
4. FEELING TIRED OR HAVING LITTLE ENERGY: 1
SUM OF ALL RESPONSES TO PHQ QUESTIONS 1-9: 7
SUM OF ALL RESPONSES TO PHQ QUESTIONS 1-9: 7
8. MOVING OR SPEAKING SO SLOWLY THAT OTHER PEOPLE COULD HAVE NOTICED. OR THE OPPOSITE, BEING SO FIGETY OR RESTLESS THAT YOU HAVE BEEN MOVING AROUND A LOT MORE THAN USUAL: 0
SUM OF ALL RESPONSES TO PHQ QUESTIONS 1-9: 7
3. TROUBLE FALLING OR STAYING ASLEEP: 1
6. FEELING BAD ABOUT YOURSELF - OR THAT YOU ARE A FAILURE OR HAVE LET YOURSELF OR YOUR FAMILY DOWN: 2
9. THOUGHTS THAT YOU WOULD BE BETTER OFF DEAD, OR OF HURTING YOURSELF: 0
SUM OF ALL RESPONSES TO PHQ QUESTIONS 1-9: 7
10. IF YOU CHECKED OFF ANY PROBLEMS, HOW DIFFICULT HAVE THESE PROBLEMS MADE IT FOR YOU TO DO YOUR WORK, TAKE CARE OF THINGS AT HOME, OR GET ALONG WITH OTHER PEOPLE: 1
5. POOR APPETITE OR OVEREATING: 0
7. TROUBLE CONCENTRATING ON THINGS, SUCH AS READING THE NEWSPAPER OR WATCHING TELEVISION: 1

## 2024-02-06 NOTE — PATIENT INSTRUCTIONS
reliever, such as acetaminophen (Tylenol).   When should you call for help?   Call 911 if you have symptoms of a heart attack. These may include:    Chest pain or pressure, or a strange feeling in the chest.     Sweating.     Shortness of breath.     Pain, pressure, or a strange feeling in the back, neck, jaw, or upper belly or in one or both shoulders or arms.     Lightheadedness or sudden weakness.     A fast or irregular heartbeat.   After you call 911, the  may tell you to chew 1 adult-strength or 2 to 4 low-dose aspirin. Wait for an ambulance. Do not try to drive yourself.  Watch closely for changes in your health, and be sure to contact your doctor if you have any problems.  Where can you learn more?  Go to https://www.[employer].net/patientEd and enter F075 to learn more about \"A Healthy Heart: Care Instructions.\"  Current as of: June 25, 2023               Content Version: 13.9  © 0417-0310 [employer].   Care instructions adapted under license by [employer]. If you have questions about a medical condition or this instruction, always ask your healthcare professional. [employer] disclaims any warranty or liability for your use of this information.      Personalized Preventive Plan for Kae Wynne - 2/6/2024  Medicare offers a range of preventive health benefits. Some of the tests and screenings are paid in full while other may be subject to a deductible, co-insurance, and/or copay.    Some of these benefits include a comprehensive review of your medical history including lifestyle, illnesses that may run in your family, and various assessments and screenings as appropriate.    After reviewing your medical record and screening and assessments performed today your provider may have ordered immunizations, labs, imaging, and/or referrals for you.  A list of these orders (if applicable) as well as your Preventive Care list are included within your After Visit Summary for

## 2024-02-06 NOTE — PROGRESS NOTES
range of motion, no joint swelling, deformity or tenderness  Neurologic: gait and coordination normal and speech normal       Allergies   Allergen Reactions    Tetracycline Other (See Comments)     GI upset     Prior to Visit Medications    Medication Sig Taking? Authorizing Provider   atorvastatin (LIPITOR) 20 MG tablet TAKE 1 TABLET DAILY Yes Garland Blakely MD   esomeprazole (NEXIUM) 40 MG delayed release capsule TAKE 1 CAPSULE DAILY Yes Garland Blakely MD   estradiol (ESTRACE) 0.1 MG/GM vaginal cream INSERT 2 GRAMS VAGINALLY 2 TIMES A WEEK Yes Garland Blakely MD   albuterol sulfate HFA (PROVENTIL;VENTOLIN;PROAIR) 108 (90 Base) MCG/ACT inhaler Inhale 2 puffs into the lungs every 4 hours as needed Yes Automatic Reconciliation, Ar   amphetamine-dextroamphetamine (ADDERALL) 30 MG tablet Take 0.5 tablets by mouth 2 times daily. Yes Automatic Reconciliation, Ar   aspirin 81 MG EC tablet Take 1 tablet by mouth Yes Automatic Reconciliation, Ar   Cholecalciferol 50 MCG (2000 UT) TABS Take 1 tablet by mouth daily Yes Automatic Reconciliation, Ar   citalopram (CELEXA) 10 MG tablet ceived the following from [facility]: Outside name: citalopram (CELEXA) 10 mg tablet Yes Automatic Reconciliation, Ar   clonazePAM (KLONOPIN) 0.5 MG tablet Take 1 tablet by mouth. Yes Automatic Reconciliation, Ar   denosumab (PROLIA) 60 MG/ML SOSY SC injection Inject 1 mL into the skin Yes Automatic Reconciliation, Ar       CareTeam (Including outside providers/suppliers regularly involved in providing care):   Patient Care Team:  Garland Blakely MD as PCP - General  Garland Blakely MD as PCP - Empaneled Provider  Jerod Truong MD as Physician  Ricardo Lamb MD as Surgeon  Cassie Campos MD (Obstetrics & Gynecology)     Reviewed and updated this visit:  Tobacco  Allergies  Meds  Med Hx  Surg Hx  Soc Hx  Fam Hx

## 2024-02-07 DIAGNOSIS — J40 BRONCHITIS: ICD-10-CM

## 2024-02-07 RX ORDER — FLUTICASONE PROPIONATE AND SALMETEROL 100; 50 UG/1; UG/1
1 POWDER RESPIRATORY (INHALATION) 2 TIMES DAILY
Qty: 60 EACH | Refills: 5 | Status: SHIPPED | OUTPATIENT
Start: 2024-02-07

## 2024-02-09 LAB
., LABCORP: ABNORMAL
CYTOLOGIST CVX/VAG CYTO: ABNORMAL
CYTOLOGY CVX/VAG DOC CYTO: ABNORMAL
CYTOLOGY CVX/VAG DOC THIN PREP: ABNORMAL
DX ICD CODE: ABNORMAL
DX ICD CODE: ABNORMAL
HPV I/H RISK 4 DNA CVX QL PROBE+SIG AMP: NEGATIVE
Lab: ABNORMAL
OTHER STN SPEC: ABNORMAL
PATHOLOGIST CVX/VAG CYTO: ABNORMAL
STAT OF ADQ CVX/VAG CYTO-IMP: ABNORMAL

## 2024-02-12 ENCOUNTER — TELEPHONE (OUTPATIENT)
Age: 69
End: 2024-02-12

## 2024-02-12 ENCOUNTER — PATIENT MESSAGE (OUTPATIENT)
Age: 69
End: 2024-02-12

## 2024-02-12 RX ORDER — ESOMEPRAZOLE MAGNESIUM 40 MG/1
40 CAPSULE, DELAYED RELEASE ORAL DAILY
Qty: 90 CAPSULE | Refills: 1 | Status: SHIPPED | OUTPATIENT
Start: 2024-02-12 | End: 2024-02-16

## 2024-02-12 RX ORDER — ATORVASTATIN CALCIUM 20 MG/1
20 TABLET, FILM COATED ORAL DAILY
Qty: 90 TABLET | Refills: 1 | Status: SHIPPED | OUTPATIENT
Start: 2024-02-12

## 2024-02-12 NOTE — TELEPHONE ENCOUNTER
PT name and  verified    69 yo last ov 24 next ae 2/3/25    PT calling on the results of her pap from ov 24. PT has viewed them over the weekend and relayed to PT that MD has not reviewed and needs to before discussing and advising.   PT verbalizes understanding.    Please review and advise  Thank you

## 2024-02-12 NOTE — TELEPHONE ENCOUNTER
From: Kae Wynne  To: Dr. Garland Blakely  Sent: 2/12/2024 1:26 PM EST  Subject: Prescriptions    Hi Dr. Blakely,  At my appointment I forgot to ask for refills for my prescriptions. I tried to use the prescription message it does not have a mail order option. [facility] is the Mail Order so can you please request the 90-day Atorvastatin Calcium Tab 20 mg and 90-day Esomeprazole Magnesium cap 40 mg     Thanks,  Talita Wynne

## 2024-02-12 NOTE — TELEPHONE ENCOUNTER
Last Appointment with Dr. Garland Blakely:  2/6/2024   Future Appointments   Date Time Provider Department Center   2/19/2024  3:00 PM [facility] CT MAIN 1 [facility]   2/19/2024  3:30 PM [facility] DEXA 1 [facility]   2/3/2025  1:20 PM JUDIE GREGG ANDRADE [facility]   2/3/2025  1:40 PM Cassie Campos MD [facility]

## 2024-02-12 NOTE — TELEPHONE ENCOUNTER
PT call returned name and  verified    MD reviewed pap and relayed message:    Pap shows benign cellular changes. HPV is negative. This is considered an normal pap   Written by Cassie Campos MD on 2024  3:54 PM EST    Cassie Campos MD  2024  3:54 PM EST Back to Top      Pap 2 years       PT verbalizes understanding.

## 2024-02-16 RX ORDER — ESOMEPRAZOLE MAGNESIUM 40 MG/1
CAPSULE, DELAYED RELEASE ORAL
Qty: 90 CAPSULE | Refills: 0 | Status: SHIPPED | OUTPATIENT
Start: 2024-02-16

## 2024-02-19 ENCOUNTER — HOSPITAL ENCOUNTER (OUTPATIENT)
Facility: HOSPITAL | Age: 69
Discharge: HOME | End: 2024-02-22
Attending: INTERNAL MEDICINE
Payer: MEDICARE

## 2024-02-19 VITALS — HEIGHT: 61 IN | BODY MASS INDEX: 26.24 KG/M2 | WEIGHT: 139 LBS

## 2024-02-19 DIAGNOSIS — M81.0 AGE-RELATED OSTEOPOROSIS WITHOUT CURRENT PATHOLOGICAL FRACTURE: ICD-10-CM

## 2024-02-19 DIAGNOSIS — Z72.0 TOBACCO ABUSE: ICD-10-CM

## 2024-02-19 DIAGNOSIS — E78.2 MIXED HYPERLIPIDEMIA: ICD-10-CM

## 2024-02-19 LAB
ALBUMIN SERPL-MCNC: 3.7 G/DL (ref 3.5–5)
ALBUMIN/GLOB SERPL: 1.1 (ref 1.1–2.2)
ALP SERPL-CCNC: 135 U/L (ref 45–117)
ALT SERPL-CCNC: 13 U/L (ref 12–78)
ANION GAP SERPL CALC-SCNC: 4 MMOL/L (ref 5–15)
AST SERPL-CCNC: 13 U/L (ref 15–37)
BASOPHILS # BLD: 0.1 K/UL (ref 0–0.1)
BASOPHILS NFR BLD: 1 % (ref 0–1)
BILIRUB SERPL-MCNC: 0.4 MG/DL (ref 0.2–1)
BUN SERPL-MCNC: 14 MG/DL (ref 6–20)
BUN/CREAT SERPL: 15 (ref 12–20)
CALCIUM SERPL-MCNC: 9.1 MG/DL (ref 8.5–10.1)
CHLORIDE SERPL-SCNC: 106 MMOL/L (ref 97–108)
CHOLEST SERPL-MCNC: 164 MG/DL
CO2 SERPL-SCNC: 29 MMOL/L (ref 21–32)
CREAT SERPL-MCNC: 0.94 MG/DL (ref 0.55–1.02)
DIFFERENTIAL METHOD BLD: NORMAL
EOSINOPHIL # BLD: 0.2 K/UL (ref 0–0.4)
EOSINOPHIL NFR BLD: 2 % (ref 0–7)
ERYTHROCYTE [DISTWIDTH] IN BLOOD BY AUTOMATED COUNT: 12.8 % (ref 11.5–14.5)
GLOBULIN SER CALC-MCNC: 3.5 G/DL (ref 2–4)
GLUCOSE SERPL-MCNC: 119 MG/DL (ref 65–100)
HCT VFR BLD AUTO: 43.8 % (ref 35–47)
HDLC SERPL-MCNC: 45 MG/DL
HDLC SERPL: 3.6 (ref 0–5)
HGB BLD-MCNC: 13.8 G/DL (ref 11.5–16)
IMM GRANULOCYTES # BLD AUTO: 0 K/UL (ref 0–0.04)
IMM GRANULOCYTES NFR BLD AUTO: 0 % (ref 0–0.5)
LDLC SERPL CALC-MCNC: 103 MG/DL (ref 0–100)
LYMPHOCYTES # BLD: 2.7 K/UL (ref 0.8–3.5)
LYMPHOCYTES NFR BLD: 34 % (ref 12–49)
MCH RBC QN AUTO: 30.5 PG (ref 26–34)
MCHC RBC AUTO-ENTMCNC: 31.5 G/DL (ref 30–36.5)
MCV RBC AUTO: 96.7 FL (ref 80–99)
MONOCYTES # BLD: 0.6 K/UL (ref 0–1)
MONOCYTES NFR BLD: 7 % (ref 5–13)
NEUTS SEG # BLD: 4.5 K/UL (ref 1.8–8)
NEUTS SEG NFR BLD: 56 % (ref 32–75)
NRBC # BLD: 0 K/UL (ref 0–0.01)
NRBC BLD-RTO: 0 PER 100 WBC
PLATELET # BLD AUTO: 242 K/UL (ref 150–400)
PMV BLD AUTO: 11.5 FL (ref 8.9–12.9)
POTASSIUM SERPL-SCNC: 4.6 MMOL/L (ref 3.5–5.1)
PROT SERPL-MCNC: 7.2 G/DL (ref 6.4–8.2)
RBC # BLD AUTO: 4.53 M/UL (ref 3.8–5.2)
SODIUM SERPL-SCNC: 139 MMOL/L (ref 136–145)
TRIGL SERPL-MCNC: 80 MG/DL
VLDLC SERPL CALC-MCNC: 16 MG/DL
WBC # BLD AUTO: 7.9 K/UL (ref 3.6–11)

## 2024-02-19 PROCEDURE — 71271 CT THORAX LUNG CANCER SCR C-: CPT

## 2024-02-19 PROCEDURE — 77080 DXA BONE DENSITY AXIAL: CPT

## 2024-02-20 LAB
T4 SERPL-MCNC: 13.4 UG/DL (ref 4.8–13.9)
TSH SERPL DL<=0.05 MIU/L-ACNC: 3.76 UIU/ML (ref 0.36–3.74)

## 2024-04-25 RX ORDER — ACETAMINOPHEN 160 MG
2000 TABLET,DISINTEGRATING ORAL DAILY
COMMUNITY

## 2024-04-25 RX ORDER — ESOMEPRAZOLE MAGNESIUM 40 MG/1
40 CAPSULE, DELAYED RELEASE ORAL
COMMUNITY

## 2024-04-25 RX ORDER — DEXTROAMPHETAMINE SACCHARATE, AMPHETAMINE ASPARTATE, DEXTROAMPHETAMINE SULFATE AND AMPHETAMINE SULFATE 3.75; 3.75; 3.75; 3.75 MG/1; MG/1; MG/1; MG/1
15 TABLET ORAL 2 TIMES DAILY
COMMUNITY

## 2024-04-25 RX ORDER — ATORVASTATIN CALCIUM 20 MG/1
20 TABLET, FILM COATED ORAL
COMMUNITY

## 2024-04-25 NOTE — FLOWSHEET NOTE
Friday 8a - 11am. There are no Saturday or Sunday swabbing at any [facility] facility. (patient verbalizes understanding) not applicable

## 2024-05-01 ENCOUNTER — ANESTHESIA (OUTPATIENT)
Facility: HOSPITAL | Age: 69
End: 2024-05-01
Payer: MEDICARE

## 2024-05-01 ENCOUNTER — ANESTHESIA EVENT (OUTPATIENT)
Facility: HOSPITAL | Age: 69
End: 2024-05-01
Payer: MEDICARE

## 2024-05-01 ENCOUNTER — HOSPITAL ENCOUNTER (OUTPATIENT)
Facility: HOSPITAL | Age: 69
Setting detail: OUTPATIENT SURGERY
Discharge: HOME | End: 2024-05-01
Attending: SPECIALIST | Admitting: SPECIALIST
Payer: MEDICARE

## 2024-05-01 VITALS
RESPIRATION RATE: 16 BRPM | OXYGEN SATURATION: 100 % | WEIGHT: 143.96 LBS | HEART RATE: 60 BPM | BODY MASS INDEX: 27.18 KG/M2 | TEMPERATURE: 97 F | SYSTOLIC BLOOD PRESSURE: 118 MMHG | HEIGHT: 61 IN | DIASTOLIC BLOOD PRESSURE: 61 MMHG

## 2024-05-01 PROCEDURE — 7100000011 HC PHASE II RECOVERY - ADDTL 15 MIN: Performed by: SPECIALIST

## 2024-05-01 PROCEDURE — 3700000000 HC ANESTHESIA ATTENDED CARE: Performed by: SPECIALIST

## 2024-05-01 PROCEDURE — 3600007512: Performed by: SPECIALIST

## 2024-05-01 PROCEDURE — 7100000010 HC PHASE II RECOVERY - FIRST 15 MIN: Performed by: SPECIALIST

## 2024-05-01 PROCEDURE — 3600007502: Performed by: SPECIALIST

## 2024-05-01 PROCEDURE — 2500000003 HC RX 250 WO HCPCS: Performed by: NURSE ANESTHETIST, CERTIFIED REGISTERED

## 2024-05-01 PROCEDURE — 88305 TISSUE EXAM BY PATHOLOGIST: CPT

## 2024-05-01 PROCEDURE — 6360000002 HC RX W HCPCS: Performed by: NURSE ANESTHETIST, CERTIFIED REGISTERED

## 2024-05-01 PROCEDURE — 3700000001 HC ADD 15 MINUTES (ANESTHESIA): Performed by: SPECIALIST

## 2024-05-01 PROCEDURE — 2580000003 HC RX 258: Performed by: SPECIALIST

## 2024-05-01 RX ORDER — LIDOCAINE HYDROCHLORIDE 20 MG/ML
INJECTION, SOLUTION INTRAVENOUS PRN
Status: DISCONTINUED | OUTPATIENT
Start: 2024-05-01 | End: 2024-05-01 | Stop reason: SDUPTHER

## 2024-05-01 RX ORDER — PROPOFOL 10 MG/ML
INJECTION, EMULSION INTRAVENOUS PRN
Status: DISCONTINUED | OUTPATIENT
Start: 2024-05-01 | End: 2024-05-01 | Stop reason: SDUPTHER

## 2024-05-01 RX ORDER — SODIUM CHLORIDE 0.9 % (FLUSH) 0.9 %
5-40 SYRINGE (ML) INJECTION PRN
Status: DISCONTINUED | OUTPATIENT
Start: 2024-05-01 | End: 2024-05-01 | Stop reason: HOSPADM

## 2024-05-01 RX ORDER — SIMETHICONE 40MG/0.6ML
40 SUSPENSION, DROPS(FINAL DOSAGE FORM)(ML) ORAL AS NEEDED
Status: DISCONTINUED | OUTPATIENT
Start: 2024-05-01 | End: 2024-05-01 | Stop reason: HOSPADM

## 2024-05-01 RX ORDER — SODIUM CHLORIDE 9 MG/ML
INJECTION, SOLUTION INTRAVENOUS CONTINUOUS
Status: DISCONTINUED | OUTPATIENT
Start: 2024-05-01 | End: 2024-05-01 | Stop reason: HOSPADM

## 2024-05-01 RX ORDER — DEXMEDETOMIDINE HYDROCHLORIDE 100 UG/ML
INJECTION, SOLUTION INTRAVENOUS PRN
Status: DISCONTINUED | OUTPATIENT
Start: 2024-05-01 | End: 2024-05-01 | Stop reason: SDUPTHER

## 2024-05-01 RX ORDER — SIMETHICONE 40MG/0.6ML
SUSPENSION, DROPS(FINAL DOSAGE FORM)(ML) ORAL
Status: DISCONTINUED
Start: 2024-05-01 | End: 2024-05-01 | Stop reason: HOSPADM

## 2024-05-01 RX ADMIN — LIDOCAINE HYDROCHLORIDE 60 MG: 20 INJECTION, SOLUTION INTRAVENOUS at 09:31

## 2024-05-01 RX ADMIN — LIDOCAINE HYDROCHLORIDE 40 MG: 20 INJECTION, SOLUTION INTRAVENOUS at 09:35

## 2024-05-01 RX ADMIN — DEXMEDETOMIDINE 10 MCG: 100 INJECTION, SOLUTION INTRAVENOUS at 09:37

## 2024-05-01 RX ADMIN — PROPOFOL 40 MG: 10 INJECTION, EMULSION INTRAVENOUS at 09:35

## 2024-05-01 RX ADMIN — SODIUM CHLORIDE: 9 INJECTION, SOLUTION INTRAVENOUS at 08:21

## 2024-05-01 RX ADMIN — PROPOFOL 140 MCG/KG/MIN: 10 INJECTION, EMULSION INTRAVENOUS at 09:32

## 2024-05-01 RX ADMIN — PROPOFOL 60 MG: 10 INJECTION, EMULSION INTRAVENOUS at 09:31

## 2024-05-01 ASSESSMENT — ENCOUNTER SYMPTOMS: STRIDOR: 0

## 2024-05-01 ASSESSMENT — PAIN - FUNCTIONAL ASSESSMENT: PAIN_FUNCTIONAL_ASSESSMENT: 0-10

## 2024-05-01 ASSESSMENT — LIFESTYLE VARIABLES: SMOKING_STATUS: 1

## 2024-05-01 NOTE — OP NOTE
removed with cold snare, hemostasis confirmed, and all samples retrieved.    Clinically insignificant descending colon lipoma noted, confirmed with pillow sign.    Specimens:    See above    Complications:   None; patient tolerated the procedure well.    Impression:  Colon polyps three     Recommendations:     - Await pathology.    Thank you for entrusting me with this patient's care.  Please do not hesitate to contact me with any questions or if I can be of assistance with any of your other patients' GI needs.    Signed By: Aung Valenzuela MD                        May 1, 2024      Surgical assistant none.  Implants none unless specified.

## 2024-05-01 NOTE — ANESTHESIA POSTPROCEDURE EVALUATION
Department of Anesthesiology  Postprocedure Note    Patient: Kae Wynne  MRN: [id number]  YOB: 1955  Date of evaluation: 5/1/2024    Procedure Summary       Date: 05/01/24 Room / Location: [place] [address] / [facility] ENDOSCOPY    Anesthesia Start: 0927 Anesthesia Stop: 0953    Procedures:       COLONOSCOPY      COLONOSCOPY POLYPECTOMY SNARE BIOPSY (Lower GI Region) Diagnosis:       Personal history of colonic polyps      Benign neoplasm of transverse colon      (Personal history of colonic polyps [Z86.010])      (Benign neoplasm of transverse colon [D12.3])    Surgeons: Aung Valenzuela MD Responsible Provider: Andreas Salinas MD    Anesthesia Type: MAC ASA Status: 3            Anesthesia Type: No value filed.    Chandana Phase I: Chandana Score: 10    Chandana Phase II: Chandana Score: 8    Anesthesia Post Evaluation    Patient location during evaluation: PACU  Patient participation: complete - patient participated  Pain score: 0  Nausea & Vomiting: no vomiting and no nausea  Cardiovascular status: hemodynamically stable  Respiratory status: room air  Hydration status: stable  There was medical reason for not using a multimodal analgesia pain management approach.    No notable events documented.

## 2024-05-01 NOTE — H&P
Pre-Endoscopy H&P Update  Chief complaint/HPI/ROS:  The indication for the procedure, the patient's history and the patient's current medications are reviewed prior to the procedure and that data is reported on the H&P to which this document is attached.  Any significant complaints with regard to organ systems will be noted, and if not mentioned then a review of systems is not contributory.  Past Medical History:   Diagnosis Date    Abnormal Pap smear of cervix 01/31/2024    ASC-US/HPV normal    Anxiety     CAD (coronary artery disease)     Cerebral artery occlusion with cerebral infarction (HCC)     no deficits/was seen on MRI/gray matter on MRI - pt states normal aging    Colon polyps     one was pre-cancerous per pt    Depression     Dry eyes     GERD (gastroesophageal reflux disease)     Hemorrhoids     Hepatitis C     genotype 1    History of kidney stones     none in 20 yrs    Hx of amaurosis fugax     right eye, 2014/\"freckle per pt\"    Liver disease 2011    hep c, [place] 2011    Meningioma (HCC) 2014    SURGERY TO BE SCHEDULED/bening/partially removed/monitor with MRI every 2 yrs    Migraines     Osteoporosis 2008    Psychiatric disorder     ANXIETY AND DEPRESSION    PVD (peripheral vascular disease) (HCC) 2/2014    W/STENT , DR LAMB    Seizures (HCC) 2013    ? SEIZURE, CONFUSION, TROUBLE WITH SPEECH      Past Surgical History:   Procedure Laterality Date    CHOLECYSTECTOMY  2001    COLONOSCOPY  10/27/2015    CRANIOTOMY  6/25/14    meningioma r medial sphenoid wing    ENDOSCOPY, COLON, DIAGNOSTIC      GI  2002    gall bladder removal    GYN  1984    Tubal pregnancy    HYSTERECTOMY, TOTAL ABDOMINAL (CERVIX REMOVED)      OTHER SURGICAL HISTORY  2/27/2014    Dr. Lamb     TUBAL LIGATION      VASCULAR SURGERY Right 6/10/14    CAROTID - endarterectom    VASCULAR SURGERY  2/2014    ABD AORTIC STENT     Social   Social History     Tobacco Use    Smoking status: Every Day     Current packs/day: 0.50     Average

## 2024-05-01 NOTE — PERIOP NOTE
Kae Wynne  1955  [id number]    Situation:  Verbal report received from: MANOJ Emanuel  Procedure: Procedure(s):  COLONOSCOPY  COLONOSCOPY POLYPECTOMY SNARE BIOPSY    Background:    Preoperative diagnosis: Personal history of colonic polyps [Z86.010]  Benign neoplasm of transverse colon [D12.3]  Postoperative diagnosis: * No post-op diagnosis entered *    :  Dr. Valenzuela  Assistant(s): Circulator: Kit De La Fuente RN  Endoscopy Technician: Puma Valenzuela    Specimens:   ID Type Source Tests Collected by Time Destination   1 : Ascending colon polyp Tissue Colon-Ascending SURGICAL PATHOLOGY Aung Valenzuela MD 5/1/2024 0941    2 : Descending colon polyp Tissue Colon-Descending SURGICAL PATHOLOGY Aung Valenzuela MD 5/1/2024 0945    3 : Sigmoid colon polyp Tissue Sigmoid Colon SURGICAL PATHOLOGY Aung Valenzuela MD 5/1/2024 0948      H. Pylori test: No    Assessment:    Anesthesia gave intra-procedure sedation and medications, see anesthesia flow sheet     Intravenous fluids: NS@ KVO     Vital signs stable Yes    Abdominal assessment: round and soft Yes    Recommendation:  Discharge patient per MD order Yes.  Return to floor N/A  Ride home with:   Permission to share finding with family or friend Yes    Endoscopy discharge instructions have been reviewed and given to patient and caregiver.  The patient and caregiver verbalized understanding and acceptance of instructions.      Dr. Valenzuela discussed with patient and caregiver procedure findings and next steps.

## 2024-05-01 NOTE — H&P
68 y.o. female for open access colonoscopy for screening   Additional data for completion of the targeted pre-endoscopy H&P will be provided under 'H&P interval notes'.  Please see that document which will be attached to this.  Aung Valenzuela MD    Last colonoscopy Dr. Edmond 2021 adenomas multiple, several procedures prior with >4 small adenomas as well.

## 2024-07-19 RX ORDER — ESTRADIOL 0.1 MG/G
CREAM VAGINAL
Qty: 42.5 G | Refills: 2 | Status: SHIPPED | OUTPATIENT
Start: 2024-07-19

## 2024-07-19 RX ORDER — ESOMEPRAZOLE MAGNESIUM 40 MG/1
40 CAPSULE, DELAYED RELEASE ORAL DAILY
Qty: 90 CAPSULE | Refills: 1 | Status: SHIPPED | OUTPATIENT
Start: 2024-07-19

## 2024-12-22 RX ORDER — ATORVASTATIN CALCIUM 20 MG/1
20 TABLET, FILM COATED ORAL DAILY
Qty: 90 TABLET | Refills: 2 | Status: SHIPPED | OUTPATIENT
Start: 2024-12-22

## 2024-12-22 RX ORDER — ESOMEPRAZOLE MAGNESIUM 40 MG/1
40 CAPSULE, DELAYED RELEASE ORAL DAILY
Qty: 90 CAPSULE | Refills: 1 | Status: SHIPPED | OUTPATIENT
Start: 2024-12-22

## 2025-01-07 NOTE — PROGRESS NOTES
Kae Wynne is a 69 y.o. female returns for an annual exam     Chief Complaint   Patient presents with    Annual Exam     No LMP recorded. Patient has had a hysterectomy.  Her periods are absent in flow   Problems: mole/growth/skin tag on buttocks  Birth Control: status post hysterectomy.  Last Pap: ASC-US, obtained 1/31/24  She does not have a history of RADHA 2, 3 or cervical cancer.   Last Mammogram: has not had a recent mammogram.   Last Bone Density: Osteoporosis obtained 2/20/24  Last colonoscopy: 2024,found polyps per pt      1. Have you been to the ER, urgent care clinic, or hospitalized since your last visit? No    2. Have you seen or consulted any other health care providers outside of the [facility] since your last visit? No    Examination chaperoned by Iliana Ellis LPN.

## 2025-02-03 ENCOUNTER — OFFICE VISIT (OUTPATIENT)
Age: 70
End: 2025-02-03
Payer: MEDICARE

## 2025-02-03 VITALS
RESPIRATION RATE: 18 BRPM | OXYGEN SATURATION: 97 % | WEIGHT: 151 LBS | DIASTOLIC BLOOD PRESSURE: 72 MMHG | HEIGHT: 61 IN | SYSTOLIC BLOOD PRESSURE: 118 MMHG | BODY MASS INDEX: 28.51 KG/M2 | HEART RATE: 84 BPM

## 2025-02-03 DIAGNOSIS — Z01.419 ENCOUNTER FOR GYNECOLOGICAL EXAMINATION (GENERAL) (ROUTINE) WITHOUT ABNORMAL FINDINGS: Primary | ICD-10-CM

## 2025-02-03 PROCEDURE — 1160F RVW MEDS BY RX/DR IN RCRD: CPT | Performed by: OBSTETRICS & GYNECOLOGY

## 2025-02-03 PROCEDURE — 99397 PER PM REEVAL EST PAT 65+ YR: CPT | Performed by: OBSTETRICS & GYNECOLOGY

## 2025-02-03 PROCEDURE — 1159F MED LIST DOCD IN RCRD: CPT | Performed by: OBSTETRICS & GYNECOLOGY

## 2025-02-03 NOTE — PROGRESS NOTES
Annual exam ages 65+ post hysterectomy    Kae Wynne is a ,  69 y.o. female White (non-) who presents for her annual checkup. She is having problems - skin tags.      Hormonal status:  She is not having vasomotor symptoms.  The patient is not using any ERT.    Sexual history:     reports that she is not currently sexually active and has had partner(s) who are male. She reports using the following methods of birth control/protection: Surgical, None, and Abstinence.    Pap and Mammogram History:    Last Pap: ASC-US, obtained 24  She does not have a history of RADHA 2, 3 or cervical cancer.   Last Mammogram: has not had a recent mammogram.   Last Bone Density: Osteoporosis obtained 24  Last colonoscopy: ,found polyps per pt      Breast Cancer History/Substance Abuse:  There is not  a history of breast cancer in her family.    Tobacco History:  reports that she has been smoking cigarettes. She started smoking about 52 years ago. She has a 51 pack-year smoking history. She has never used smokeless tobacco.  Alcohol Abuse:  reports that she does not currently use alcohol.  Drug Abuse:  reports no history of drug use.    Osteoporosis History:    Family history does not include a first or second degree relative with osteopenia or osteoporosis.    A bone density scan was obtained   Family History   Problem Relation Age of Onset    Lung Disease Mother         COPD    Hypertension Mother         on medication    Alcohol Abuse Mother         Quit Drinking     Heart Surgery Mother         CABG X3 VESSEL    Cancer Mother         Lung    Heart Disease Mother         Heart attack and triple bypass    Anxiety Disorder Mother     Asthma Father         Had since a child    Alcohol Abuse Father         Never stopped    Diabetes Sister     Lung Disease Sister         COPD    Hypertension Sister         on medication    Heart Disease Sister         mild heart attach 2018    Anesth Problems Sister

## 2025-02-04 NOTE — PROGRESS NOTES
Kae Wynne is a 69 y.o. female presents for a problem visit.    Chief Complaint   Patient presents with    Ultrasound    Follow-up     No LMP recorded. Patient has had a hysterectomy.  Birth Control: status post hysterectomy.  Last Pap: ASC-US obtained 1/31/24    The patient is here for US f/u        1. Have you been to the ER, urgent care clinic, or hospitalized since your last visit? No    2. Have you seen or consulted any other health care providers outside of the [facility] since your last visit? No    Examination chaperoned by Iliana Ellis LPN.

## 2025-02-12 ENCOUNTER — HOSPITAL ENCOUNTER (EMERGENCY)
Facility: HOSPITAL | Age: 70
Discharge: HOME | End: 2025-02-12
Attending: EMERGENCY MEDICINE
Payer: MEDICARE

## 2025-02-12 VITALS
BODY MASS INDEX: 28.51 KG/M2 | HEART RATE: 75 BPM | SYSTOLIC BLOOD PRESSURE: 132 MMHG | HEIGHT: 61 IN | RESPIRATION RATE: 18 BRPM | OXYGEN SATURATION: 97 % | WEIGHT: 151 LBS | TEMPERATURE: 99.1 F | DIASTOLIC BLOOD PRESSURE: 73 MMHG

## 2025-02-12 DIAGNOSIS — J10.1 INFLUENZA A: Primary | ICD-10-CM

## 2025-02-12 LAB
FLUAV RNA SPEC QL NAA+PROBE: DETECTED
FLUBV RNA SPEC QL NAA+PROBE: NOT DETECTED
SARS-COV-2 RNA RESP QL NAA+PROBE: NOT DETECTED
SOURCE: ABNORMAL

## 2025-02-12 PROCEDURE — 99283 EMERGENCY DEPT VISIT LOW MDM: CPT

## 2025-02-12 PROCEDURE — 87636 SARSCOV2 & INF A&B AMP PRB: CPT

## 2025-02-12 RX ORDER — OSELTAMIVIR PHOSPHATE 75 MG/1
75 CAPSULE ORAL 2 TIMES DAILY
Qty: 10 CAPSULE | Refills: 0 | Status: SHIPPED | OUTPATIENT
Start: 2025-02-12 | End: 2025-02-17

## 2025-02-12 RX ORDER — BENZONATATE 100 MG/1
100-200 CAPSULE ORAL 3 TIMES DAILY PRN
Qty: 42 CAPSULE | Refills: 0 | Status: SHIPPED | OUTPATIENT
Start: 2025-02-12 | End: 2025-02-19

## 2025-02-12 NOTE — ED PROVIDER NOTES
[place] EMERGENCY DEPARTMENT  EMERGENCY DEPARTMENT ENCOUNTER      Pt Name: Kae Wynne  MRN: [id number]  Birthdate 1955  Date of evaluation: 2/12/2025  Provider: Glenn Ríos PA-C    CHIEF COMPLAINT       Chief Complaint   Patient presents with    Cough    Nasal Congestion         HISTORY OF PRESENT ILLNESS   (Location/Symptom, Timing/Onset, Context/Setting, Quality, Duration, Modifying Factors, Severity)  Note limiting factors.   69-year-old female presents with complaint of fever, cough, nasal congestion for the past 3 days.  Patient reports spending the night with her grandson over the weekend, who was sick at the time.  Denies chest pain, shortness of breath, abdominal pain, nausea, vomiting, changes in bowel or bladder.  Cough is dry and nonproductive.  No medications taken prior to arrival.  No other complaints at this time.            Review of External Medical Records:     Nursing Notes were reviewed.    REVIEW OF SYSTEMS    (2-9 systems for level 4, 10 or more for level 5)     Review of Systems    Except as noted above the remainder of the review of systems was reviewed and negative.       PAST MEDICAL HISTORY     Past Medical History:   Diagnosis Date    Abnormal Pap smear of cervix 01/31/2024    ASC-US/HPV normal    Anxiety     CAD (coronary artery disease)     Cerebral artery occlusion with cerebral infarction (HCC)     no deficits/was seen on MRI/gray matter on MRI - pt states normal aging    Colon polyps     one was pre-cancerous per pt    Depression     Dry eyes     GERD (gastroesophageal reflux disease)     Hemorrhoids     Hepatitis C     genotype 1    History of kidney stones     none in 20 yrs    Hx of amaurosis fugax     right eye, 2014/\"freckle per pt\"    Liver disease 2011    hep c, [place] 2011    Meningioma (HCC) 2014    SURGERY TO BE SCHEDULED/bening/partially removed/monitor with MRI every 2 yrs    Migraines     Osteoporosis 2008    Psychiatric disorder     ANXIETY AND

## 2025-02-12 NOTE — ED NOTES
Pt given discharge instructions, patient education, 2 prescriptions and follow up information. Pt verbalizes understanding. All questions answered. Pt discharged to home in private vehicle, ambulatory. Pt A&Ox4, RA and pain controlled.

## 2025-02-24 DIAGNOSIS — R10.2 PELVIC PAIN: Primary | ICD-10-CM

## 2025-02-27 ENCOUNTER — OFFICE VISIT (OUTPATIENT)
Age: 70
End: 2025-02-27
Payer: MEDICARE

## 2025-02-27 VITALS
BODY MASS INDEX: 27.96 KG/M2 | WEIGHT: 148 LBS | SYSTOLIC BLOOD PRESSURE: 136 MMHG | DIASTOLIC BLOOD PRESSURE: 78 MMHG | HEART RATE: 74 BPM

## 2025-02-27 DIAGNOSIS — R19.00 PELVIC MASS: Primary | ICD-10-CM

## 2025-02-27 PROCEDURE — 1123F ACP DISCUSS/DSCN MKR DOCD: CPT | Performed by: OBSTETRICS & GYNECOLOGY

## 2025-02-27 PROCEDURE — 1159F MED LIST DOCD IN RCRD: CPT | Performed by: OBSTETRICS & GYNECOLOGY

## 2025-02-27 PROCEDURE — 99212 OFFICE O/P EST SF 10 MIN: CPT | Performed by: OBSTETRICS & GYNECOLOGY

## 2025-02-27 PROCEDURE — 1160F RVW MEDS BY RX/DR IN RCRD: CPT | Performed by: OBSTETRICS & GYNECOLOGY

## 2025-02-27 NOTE — PROGRESS NOTES
OB/GYN Problem VIsit    HPI  Kae Wynne is a ,  69 y.o. female who presents for a problem visit.  She is here for possible mass felt at the top of the vagina.  She does have a history of an abdominal aortic stent that was placed.  She has not had any issues with this.  She had a colonoscopy last year that showed multiple polyps.  She gets colonoscopies frequently.        Past Medical History:   Diagnosis Date    Abnormal Pap smear of cervix 2024    ASC-US/HPV normal    Anxiety     CAD (coronary artery disease)     Cerebral artery occlusion with cerebral infarction (HCC)     no deficits/was seen on MRI/gray matter on MRI - pt states normal aging    Colon polyps     one was pre-cancerous per pt    Depression     Dry eyes     GERD (gastroesophageal reflux disease)     Hemorrhoids     Hepatitis C     genotype 1    History of kidney stones     none in 20 yrs    Hx of amaurosis fugax     right eye, /\"freckle per pt\"    Liver disease 2011    hep c, [place]     Meningioma (HCC)     SURGERY TO BE SCHEDULED/bening/partially removed/monitor with MRI every 2 yrs    Migraines     Osteoporosis     Psychiatric disorder     ANXIETY AND DEPRESSION    PVD (peripheral vascular disease) 2014    W/STENT , DR COATES    Screening mammogram for breast cancer 2025    Normal    Seizures (HCC)     ? SEIZURE, CONFUSION, TROUBLE WITH SPEECH     Past Surgical History:   Procedure Laterality Date    CHOLECYSTECTOMY      COLONOSCOPY  10/27/2015    COLONOSCOPY N/A 2024    COLONOSCOPY performed by Aung Valenzuela MD at [facility] ENDOSCOPY    COLONOSCOPY N/A 2024    COLONOSCOPY POLYPECTOMY SNARE BIOPSY performed by Anug Valenzuela MD at [facility] ENDOSCOPY    CRANIOTOMY  14    meningioma r medial sphenoid wing    ENDOSCOPY, COLON, DIAGNOSTIC      GI      gall bladder removal    GYN      Tubal pregnancy    HYSTERECTOMY, TOTAL ABDOMINAL (CERVIX REMOVED)      OTHER SURGICAL HISTORY

## 2025-03-07 ENCOUNTER — HOSPITAL ENCOUNTER (OUTPATIENT)
Facility: HOSPITAL | Age: 70
Discharge: HOME | End: 2025-03-10
Attending: OBSTETRICS & GYNECOLOGY
Payer: MEDICARE

## 2025-03-07 DIAGNOSIS — R19.00 PELVIC MASS: ICD-10-CM

## 2025-03-07 LAB — CREAT BLD-MCNC: 0.9 MG/DL (ref 0.6–1.3)

## 2025-03-07 PROCEDURE — 6360000004 HC RX CONTRAST MEDICATION: Performed by: OBSTETRICS & GYNECOLOGY

## 2025-03-07 PROCEDURE — 74177 CT ABD & PELVIS W/CONTRAST: CPT

## 2025-03-07 PROCEDURE — 82565 ASSAY OF CREATININE: CPT

## 2025-03-07 RX ORDER — IOPAMIDOL 755 MG/ML
100 INJECTION, SOLUTION INTRAVASCULAR
Status: COMPLETED | OUTPATIENT
Start: 2025-03-07 | End: 2025-03-07

## 2025-03-07 RX ADMIN — IOPAMIDOL 100 ML: 755 INJECTION, SOLUTION INTRAVENOUS at 10:07

## 2025-03-11 ENCOUNTER — TELEPHONE (OUTPATIENT)
Age: 70
End: 2025-03-11

## 2025-03-11 NOTE — TELEPHONE ENCOUNTER
Two patient identifies used      69 year old patient last seen in the office on 2/3/2025 for ae and  2/27/2025 for problem visit .      Patient has completed her CT of abdomen and pelvis and is wanting to know about the results.    Patient had the imaging done on 3/7/2025.    Please advise    Thank you

## 2025-03-12 NOTE — TELEPHONE ENCOUNTER
Patient was sent a [employer] message as per MD that the imaging results have not yet been read as of this am

## 2025-03-13 ENCOUNTER — RESULTS FOLLOW-UP (OUTPATIENT)
Facility: HOSPITAL | Age: 70
End: 2025-03-13

## 2025-03-13 NOTE — TELEPHONE ENCOUNTER
Result Care Coordination      Patient Communication     Add Comments   Add Notifications  Back to Top    This looks normal. Stent is in distal aorta which I presume is where it started. Nothing of concern!   Written by Cassie Campos MD on 3/13/2025  4:01 PM ED    Patient advised of Md reviewed imaging and recommendations.  Patient will look at her my chart to review the results.

## 2025-06-02 ENCOUNTER — TRANSCRIBE ORDERS (OUTPATIENT)
Facility: HOSPITAL | Age: 70
End: 2025-06-02

## 2025-06-02 DIAGNOSIS — D32.9 MENINGIOMA (HCC): Primary | ICD-10-CM

## 2025-06-26 ENCOUNTER — HOSPITAL ENCOUNTER (OUTPATIENT)
Facility: HOSPITAL | Age: 70
Discharge: HOME | End: 2025-06-29
Attending: NEUROLOGICAL SURGERY
Payer: MEDICARE

## 2025-06-26 VITALS — WEIGHT: 148 LBS | BODY MASS INDEX: 27.96 KG/M2

## 2025-06-26 DIAGNOSIS — D32.9 MENINGIOMA (HCC): ICD-10-CM

## 2025-06-26 PROCEDURE — A9579 GAD-BASE MR CONTRAST NOS,1ML: HCPCS | Performed by: RADIOLOGY

## 2025-06-26 PROCEDURE — 70553 MRI BRAIN STEM W/O & W/DYE: CPT

## 2025-06-26 PROCEDURE — 6360000004 HC RX CONTRAST MEDICATION: Performed by: RADIOLOGY

## 2025-06-26 RX ORDER — GADOTERIDOL 279.3 MG/ML
13 INJECTION INTRAVENOUS
Status: COMPLETED | OUTPATIENT
Start: 2025-06-26 | End: 2025-06-26

## 2025-06-26 RX ADMIN — GADOTERIDOL 13 ML: 279.3 INJECTION, SOLUTION INTRAVENOUS at 14:45

## 2025-06-27 RX ORDER — ESOMEPRAZOLE MAGNESIUM 40 MG/1
40 CAPSULE, DELAYED RELEASE ORAL DAILY
Qty: 90 CAPSULE | Refills: 1 | Status: SHIPPED | OUTPATIENT
Start: 2025-06-27

## 2025-07-20 SDOH — ECONOMIC STABILITY: INCOME INSECURITY: IN THE LAST 12 MONTHS, WAS THERE A TIME WHEN YOU WERE NOT ABLE TO PAY THE MORTGAGE OR RENT ON TIME?: NO

## 2025-07-20 SDOH — ECONOMIC STABILITY: FOOD INSECURITY: WITHIN THE PAST 12 MONTHS, YOU WORRIED THAT YOUR FOOD WOULD RUN OUT BEFORE YOU GOT MONEY TO BUY MORE.: NEVER TRUE

## 2025-07-20 SDOH — HEALTH STABILITY: PHYSICAL HEALTH: ON AVERAGE, HOW MANY DAYS PER WEEK DO YOU ENGAGE IN MODERATE TO STRENUOUS EXERCISE (LIKE A BRISK WALK)?: 0 DAYS

## 2025-07-20 SDOH — ECONOMIC STABILITY: FOOD INSECURITY: WITHIN THE PAST 12 MONTHS, THE FOOD YOU BOUGHT JUST DIDN'T LAST AND YOU DIDN'T HAVE MONEY TO GET MORE.: NEVER TRUE

## 2025-07-20 SDOH — ECONOMIC STABILITY: TRANSPORTATION INSECURITY
IN THE PAST 12 MONTHS, HAS THE LACK OF TRANSPORTATION KEPT YOU FROM MEDICAL APPOINTMENTS OR FROM GETTING MEDICATIONS?: NO

## 2025-07-20 ASSESSMENT — LIFESTYLE VARIABLES
HOW MANY STANDARD DRINKS CONTAINING ALCOHOL DO YOU HAVE ON A TYPICAL DAY: PATIENT DOES NOT DRINK
HOW OFTEN DO YOU HAVE A DRINK CONTAINING ALCOHOL: NEVER
HOW OFTEN DO YOU HAVE SIX OR MORE DRINKS ON ONE OCCASION: 1
HOW MANY STANDARD DRINKS CONTAINING ALCOHOL DO YOU HAVE ON A TYPICAL DAY: 0
HOW OFTEN DO YOU HAVE A DRINK CONTAINING ALCOHOL: 1

## 2025-07-20 ASSESSMENT — PATIENT HEALTH QUESTIONNAIRE - PHQ9
SUM OF ALL RESPONSES TO PHQ QUESTIONS 1-9: 2
SUM OF ALL RESPONSES TO PHQ QUESTIONS 1-9: 2
2. FEELING DOWN, DEPRESSED OR HOPELESS: SEVERAL DAYS
SUM OF ALL RESPONSES TO PHQ QUESTIONS 1-9: 2
SUM OF ALL RESPONSES TO PHQ QUESTIONS 1-9: 2
1. LITTLE INTEREST OR PLEASURE IN DOING THINGS: SEVERAL DAYS

## 2025-07-23 ENCOUNTER — OFFICE VISIT (OUTPATIENT)
Age: 70
End: 2025-07-23
Payer: MEDICARE

## 2025-07-23 VITALS
BODY MASS INDEX: 27.6 KG/M2 | RESPIRATION RATE: 16 BRPM | SYSTOLIC BLOOD PRESSURE: 138 MMHG | WEIGHT: 150 LBS | HEART RATE: 97 BPM | DIASTOLIC BLOOD PRESSURE: 79 MMHG | OXYGEN SATURATION: 95 % | HEIGHT: 62 IN | TEMPERATURE: 97.7 F

## 2025-07-23 DIAGNOSIS — B18.2 CHRONIC HEPATITIS C WITHOUT HEPATIC COMA (HCC): ICD-10-CM

## 2025-07-23 DIAGNOSIS — I63.9 CEREBRAL INFARCTION, UNSPECIFIED MECHANISM (HCC): ICD-10-CM

## 2025-07-23 DIAGNOSIS — E78.2 MIXED HYPERLIPIDEMIA: ICD-10-CM

## 2025-07-23 DIAGNOSIS — Z00.00 MEDICARE ANNUAL WELLNESS VISIT, SUBSEQUENT: Primary | ICD-10-CM

## 2025-07-23 DIAGNOSIS — F32.1 MAJOR DEPRESSIVE DISORDER, SINGLE EPISODE, MODERATE (HCC): ICD-10-CM

## 2025-07-23 DIAGNOSIS — Z00.00 MEDICARE ANNUAL WELLNESS VISIT, SUBSEQUENT: ICD-10-CM

## 2025-07-23 DIAGNOSIS — Z87.891 PERSONAL HISTORY OF TOBACCO USE: ICD-10-CM

## 2025-07-23 DIAGNOSIS — M81.0 AGE-RELATED OSTEOPOROSIS WITHOUT CURRENT PATHOLOGICAL FRACTURE: ICD-10-CM

## 2025-07-23 PROCEDURE — G0439 PPPS, SUBSEQ VISIT: HCPCS | Performed by: INTERNAL MEDICINE

## 2025-07-23 PROCEDURE — 99214 OFFICE O/P EST MOD 30 MIN: CPT | Performed by: INTERNAL MEDICINE

## 2025-07-23 PROCEDURE — 1123F ACP DISCUSS/DSCN MKR DOCD: CPT | Performed by: INTERNAL MEDICINE

## 2025-07-23 PROCEDURE — G0296 VISIT TO DETERM LDCT ELIG: HCPCS | Performed by: INTERNAL MEDICINE

## 2025-07-23 PROCEDURE — 1159F MED LIST DOCD IN RCRD: CPT | Performed by: INTERNAL MEDICINE

## 2025-07-23 PROCEDURE — 1125F AMNT PAIN NOTED PAIN PRSNT: CPT | Performed by: INTERNAL MEDICINE

## 2025-07-23 ASSESSMENT — PATIENT HEALTH QUESTIONNAIRE - PHQ9
9. THOUGHTS THAT YOU WOULD BE BETTER OFF DEAD, OR OF HURTING YOURSELF: NOT AT ALL
4. FEELING TIRED OR HAVING LITTLE ENERGY: NOT AT ALL
SUM OF ALL RESPONSES TO PHQ QUESTIONS 1-9: 4
6. FEELING BAD ABOUT YOURSELF - OR THAT YOU ARE A FAILURE OR HAVE LET YOURSELF OR YOUR FAMILY DOWN: NOT AT ALL
2. FEELING DOWN, DEPRESSED OR HOPELESS: SEVERAL DAYS
SUM OF ALL RESPONSES TO PHQ QUESTIONS 1-9: 4
3. TROUBLE FALLING OR STAYING ASLEEP: SEVERAL DAYS
1. LITTLE INTEREST OR PLEASURE IN DOING THINGS: SEVERAL DAYS
10. IF YOU CHECKED OFF ANY PROBLEMS, HOW DIFFICULT HAVE THESE PROBLEMS MADE IT FOR YOU TO DO YOUR WORK, TAKE CARE OF THINGS AT HOME, OR GET ALONG WITH OTHER PEOPLE: SOMEWHAT DIFFICULT
SUM OF ALL RESPONSES TO PHQ QUESTIONS 1-9: 4
7. TROUBLE CONCENTRATING ON THINGS, SUCH AS READING THE NEWSPAPER OR WATCHING TELEVISION: NOT AT ALL
SUM OF ALL RESPONSES TO PHQ QUESTIONS 1-9: 4
5. POOR APPETITE OR OVEREATING: NOT AT ALL
8. MOVING OR SPEAKING SO SLOWLY THAT OTHER PEOPLE COULD HAVE NOTICED. OR THE OPPOSITE, BEING SO FIGETY OR RESTLESS THAT YOU HAVE BEEN MOVING AROUND A LOT MORE THAN USUAL: SEVERAL DAYS

## 2025-07-23 NOTE — PROGRESS NOTES
Medicare Annual Wellness Visit    Kae Wynne is here for Medicare AWV (Patient here today for medicare wellness exam. [facility] )    Assessment & Plan   Medicare annual wellness visit, subsequent-discussed pneumonia, shingles, tetanus, COVID, RSV, and hepatitis A/B vaccines.  -     Comprehensive Metabolic Panel; Future  -     CBC with Auto Differential; Future  -     Lipid Panel; Future  -     TSH; Future  -     Vitamin D 25 Hydroxy; Future  Mixed hyperlipidemia-LDL goal of 100.  Tolerating statins.  Check labs to be sure that is met.  -     ESTABLISHED, MOD MDM, 30-39 MIN [66431]  Major depressive disorder, single episode, moderate (HCC)-followed by psychiatry.  -     ESTABLISHED, MOD MDM, 30-39 MIN [74550]  Cerebral infarction, unspecified mechanism (HCC)-continue aspirin and statin drug.  -     ESTABLISHED, MOD MDM, 30-39 MIN [74953]  Age-related osteoporosis without current pathological fracture-due for follow-up bone density now.  Continuing Prolia and check vitamin D level.  -     DEXA BONE DENSITY AXIAL SKELETON; Future  -     ESTABLISHED, MOD MDM, 30-39 MIN [43860]  -     Vitamin D 25 Hydroxy; Future  Chronic hepatitis C without hepatic coma (HCC)-advised hepatitis A/B vaccination and she will consider.  -     ESTABLISHED, MOD MDM, 30-39 MIN [59083]  Personal history of tobacco use-discussed quitting and she will try to taper off.  Also will order CT scan for screening.  -     IN VISIT TO DISCUSS LUNG CA SCREEN W LDCT  -     CT Lung Screen (Initial/Annual/Baseline); Future       Return in 1 year (on 7/23/2026) for Medicare AWV.     Subjective   Presents for a wellness exam and a follow-up.  Has generally been feeling well.  Denies headache or dizziness.  No nosebleeds.  No chest pains or shortness of breath.  No cough or wheeze.  No change in bowel or bladder habits.  Some occasional headaches.  She just had an MRI for follow-up of her meningioma.    Patient's complete Health Risk Assessment and

## 2025-07-27 RX ORDER — ATORVASTATIN CALCIUM 20 MG/1
20 TABLET, FILM COATED ORAL DAILY
Qty: 90 TABLET | Refills: 2 | Status: SHIPPED | OUTPATIENT
Start: 2025-07-27

## 2025-08-23 LAB
25(OH)D3+25(OH)D2 SERPL-MCNC: 45.9 NG/ML (ref 30–100)
ALBUMIN SERPL-MCNC: 4.2 G/DL (ref 3.9–4.9)
ALP SERPL-CCNC: 125 IU/L (ref 44–121)
ALT SERPL-CCNC: 8 IU/L (ref 0–32)
AST SERPL-CCNC: 18 IU/L (ref 0–40)
BASOPHILS # BLD AUTO: 0.1 X10E3/UL (ref 0–0.2)
BASOPHILS NFR BLD AUTO: 1 %
BILIRUB SERPL-MCNC: 0.4 MG/DL (ref 0–1.2)
BUN SERPL-MCNC: 17 MG/DL (ref 8–27)
BUN/CREAT SERPL: 20 (ref 12–28)
CALCIUM SERPL-MCNC: 9.2 MG/DL (ref 8.7–10.3)
CHLORIDE SERPL-SCNC: 102 MMOL/L (ref 96–106)
CHOLEST SERPL-MCNC: 143 MG/DL (ref 100–199)
CO2 SERPL-SCNC: 25 MMOL/L (ref 20–29)
CREAT SERPL-MCNC: 0.86 MG/DL (ref 0.57–1)
EGFRCR SERPLBLD CKD-EPI 2021: 74 ML/MIN/1.73
EOSINOPHIL # BLD AUTO: 0.1 X10E3/UL (ref 0–0.4)
EOSINOPHIL NFR BLD AUTO: 1 %
ERYTHROCYTE [DISTWIDTH] IN BLOOD BY AUTOMATED COUNT: 13.4 % (ref 11.7–15.4)
GLOBULIN SER CALC-MCNC: 2.7 G/DL (ref 1.5–4.5)
GLUCOSE SERPL-MCNC: 91 MG/DL (ref 70–99)
HCT VFR BLD AUTO: 40.4 % (ref 34–46.6)
HDLC SERPL-MCNC: 41 MG/DL
HGB BLD-MCNC: 13 G/DL (ref 11.1–15.9)
IMM GRANULOCYTES # BLD AUTO: 0 X10E3/UL (ref 0–0.1)
IMM GRANULOCYTES NFR BLD AUTO: 0 %
LDLC SERPL CALC-MCNC: 84 MG/DL (ref 0–99)
LYMPHOCYTES # BLD AUTO: 2.5 X10E3/UL (ref 0.7–3.1)
LYMPHOCYTES NFR BLD AUTO: 26 %
MCH RBC QN AUTO: 30.4 PG (ref 26.6–33)
MCHC RBC AUTO-ENTMCNC: 32.2 G/DL (ref 31.5–35.7)
MCV RBC AUTO: 94 FL (ref 79–97)
MONOCYTES # BLD AUTO: 0.8 X10E3/UL (ref 0.1–0.9)
MONOCYTES NFR BLD AUTO: 8 %
NEUTROPHILS # BLD AUTO: 6.1 X10E3/UL (ref 1.4–7)
NEUTROPHILS NFR BLD AUTO: 64 %
PLATELET # BLD AUTO: 216 X10E3/UL (ref 150–450)
POTASSIUM SERPL-SCNC: 4.7 MMOL/L (ref 3.5–5.2)
PROT SERPL-MCNC: 6.9 G/DL (ref 6–8.5)
RBC # BLD AUTO: 4.28 X10E6/UL (ref 3.77–5.28)
SODIUM SERPL-SCNC: 139 MMOL/L (ref 134–144)
TRIGL SERPL-MCNC: 97 MG/DL (ref 0–149)
TSH SERPL DL<=0.005 MIU/L-ACNC: 3.09 UIU/ML (ref 0.45–4.5)
VLDLC SERPL CALC-MCNC: 18 MG/DL (ref 5–40)
WBC # BLD AUTO: 9.5 X10E3/UL (ref 3.4–10.8)

## 2025-09-02 ENCOUNTER — HOSPITAL ENCOUNTER (OUTPATIENT)
Facility: HOSPITAL | Age: 70
Discharge: HOME | End: 2025-09-05
Attending: INTERNAL MEDICINE
Payer: MEDICARE

## 2025-09-02 DIAGNOSIS — M81.0 AGE-RELATED OSTEOPOROSIS WITHOUT CURRENT PATHOLOGICAL FRACTURE: ICD-10-CM

## 2025-09-02 DIAGNOSIS — Z87.891 PERSONAL HISTORY OF TOBACCO USE: ICD-10-CM

## 2025-09-02 PROCEDURE — 71271 CT THORAX LUNG CANCER SCR C-: CPT
